# Patient Record
Sex: MALE | Race: WHITE | NOT HISPANIC OR LATINO
[De-identification: names, ages, dates, MRNs, and addresses within clinical notes are randomized per-mention and may not be internally consistent; named-entity substitution may affect disease eponyms.]

---

## 2017-04-10 ENCOUNTER — APPOINTMENT (OUTPATIENT)
Dept: DERMATOLOGY | Facility: CLINIC | Age: 82
End: 2017-04-10

## 2017-04-10 DIAGNOSIS — F95.9 TIC DISORDER, UNSPECIFIED: ICD-10-CM

## 2017-04-10 DIAGNOSIS — Z98.890 OTHER SPECIFIED POSTPROCEDURAL STATES: ICD-10-CM

## 2017-04-10 DIAGNOSIS — Z78.9 OTHER SPECIFIED HEALTH STATUS: ICD-10-CM

## 2017-04-10 DIAGNOSIS — Z86.79 PERSONAL HISTORY OF OTHER DISEASES OF THE CIRCULATORY SYSTEM: ICD-10-CM

## 2017-04-10 DIAGNOSIS — L60.8 OTHER NAIL DISORDERS: ICD-10-CM

## 2017-04-10 DIAGNOSIS — Z87.891 PERSONAL HISTORY OF NICOTINE DEPENDENCE: ICD-10-CM

## 2017-04-10 DIAGNOSIS — L82.0 INFLAMED SEBORRHEIC KERATOSIS: ICD-10-CM

## 2017-04-10 DIAGNOSIS — Z86.39 PERSONAL HISTORY OF OTHER ENDOCRINE, NUTRITIONAL AND METABOLIC DISEASE: ICD-10-CM

## 2017-04-10 RX ORDER — WARFARIN 2.5 MG/1
2.5 TABLET ORAL
Refills: 0 | Status: ACTIVE | COMMUNITY

## 2017-04-10 RX ORDER — LOSARTAN POTASSIUM AND HYDROCHLOROTHIAZIDE 25; 100 MG/1; MG/1
100-25 TABLET ORAL
Refills: 0 | Status: ACTIVE | COMMUNITY

## 2017-04-10 RX ORDER — CITALOPRAM HYDROBROMIDE 20 MG/1
20 TABLET, FILM COATED ORAL
Refills: 0 | Status: ACTIVE | COMMUNITY

## 2017-04-10 RX ORDER — AMLODIPINE BESYLATE 5 MG/1
5 TABLET ORAL
Refills: 0 | Status: ACTIVE | COMMUNITY

## 2017-04-10 RX ORDER — MOMETASONE FUROATE 1 MG/G
0.1 OINTMENT TOPICAL TWICE DAILY
Qty: 1 | Refills: 2 | Status: ACTIVE | COMMUNITY
Start: 2017-04-10 | End: 1900-01-01

## 2017-10-25 ENCOUNTER — APPOINTMENT (OUTPATIENT)
Dept: DERMATOLOGY | Facility: CLINIC | Age: 82
End: 2017-10-25

## 2020-11-17 ENCOUNTER — APPOINTMENT (OUTPATIENT)
Dept: DERMATOLOGY | Facility: CLINIC | Age: 85
End: 2020-11-17
Payer: MEDICARE

## 2020-11-17 VITALS — WEIGHT: 185 LBS | BODY MASS INDEX: 25.06 KG/M2 | HEIGHT: 72 IN

## 2020-11-17 DIAGNOSIS — D18.01 HEMANGIOMA OF SKIN AND SUBCUTANEOUS TISSUE: ICD-10-CM

## 2020-11-17 DIAGNOSIS — D22.9 MELANOCYTIC NEVI, UNSPECIFIED: ICD-10-CM

## 2020-11-17 DIAGNOSIS — Z00.00 ENCOUNTER FOR GENERAL ADULT MEDICAL EXAMINATION W/OUT ABNORMAL FINDINGS: ICD-10-CM

## 2020-11-17 DIAGNOSIS — L82.1 OTHER SEBORRHEIC KERATOSIS: ICD-10-CM

## 2020-11-17 PROCEDURE — 99203 OFFICE O/P NEW LOW 30 MIN: CPT

## 2020-11-17 RX ORDER — SILDENAFIL 100 MG/1
100 TABLET, FILM COATED ORAL
Qty: 18 | Refills: 0 | Status: ACTIVE | COMMUNITY
Start: 2020-02-03

## 2020-11-17 RX ORDER — METFORMIN ER 500 MG 500 MG/1
500 TABLET ORAL
Qty: 360 | Refills: 0 | Status: ACTIVE | COMMUNITY
Start: 2020-06-24

## 2020-11-17 RX ORDER — FINASTERIDE 5 MG/1
5 TABLET, FILM COATED ORAL
Refills: 0 | Status: DISCONTINUED | COMMUNITY
End: 2020-11-17

## 2020-11-17 RX ORDER — DIAZEPAM 10 MG/1
10 TABLET ORAL
Qty: 30 | Refills: 0 | Status: DISCONTINUED | COMMUNITY
Start: 2020-08-28

## 2020-11-17 RX ORDER — METFORMIN HYDROCHLORIDE 500 MG/1
500 TABLET, COATED ORAL
Refills: 0 | Status: DISCONTINUED | COMMUNITY
End: 2020-11-17

## 2020-11-17 RX ORDER — ZOLPIDEM TARTRATE 10 MG/1
10 TABLET ORAL
Qty: 30 | Refills: 0 | Status: ACTIVE | COMMUNITY
Start: 2020-08-28

## 2020-11-17 RX ORDER — AZITHROMYCIN 250 MG/1
250 TABLET, FILM COATED ORAL
Qty: 6 | Refills: 0 | Status: COMPLETED | COMMUNITY
Start: 2020-09-18

## 2020-11-17 RX ORDER — ZOLPIDEM TARTRATE 5 MG/1
5 TABLET, FILM COATED ORAL
Refills: 0 | Status: DISCONTINUED | COMMUNITY
End: 2020-11-17

## 2020-11-17 RX ORDER — METFORMIN HYDROCHLORIDE 1000 MG/1
1000 TABLET, COATED ORAL
Refills: 0 | Status: DISCONTINUED | COMMUNITY
End: 2020-11-17

## 2020-11-17 RX ORDER — MIRTAZAPINE 7.5 MG/1
7.5 TABLET, FILM COATED ORAL
Qty: 90 | Refills: 0 | Status: ACTIVE | COMMUNITY
Start: 2020-09-10

## 2020-11-17 NOTE — HISTORY OF PRESENT ILLNESS
[FreeTextEntry1] : Patient presents for skin examination. [de-identified] : Notes many lesions on the trunk and exts.  No bleeding or tender lesions.  Increasing in number over the many years.

## 2020-11-17 NOTE — PHYSICAL EXAM
[FreeTextEntry3] : A full skin exam was performed including the scalp, face (including lips, ears, nose and eyes), neck, chest, abdomen, back, buttocks, upper extremities and lower extremities.  The patient declined examination of the genitalia.  \par The exam revealed the following benign growths:\par Ashe pigmented nevi.\par Seborrheic keratoses.\par Cherry angioma.\par

## 2021-10-17 ENCOUNTER — EMERGENCY (EMERGENCY)
Facility: HOSPITAL | Age: 86
LOS: 1 days | Discharge: HOME | End: 2021-10-17
Attending: INTERNAL MEDICINE | Admitting: INTERNAL MEDICINE
Payer: MEDICARE

## 2021-10-17 VITALS
SYSTOLIC BLOOD PRESSURE: 161 MMHG | OXYGEN SATURATION: 96 % | DIASTOLIC BLOOD PRESSURE: 88 MMHG | HEART RATE: 70 BPM | WEIGHT: 188.05 LBS | RESPIRATION RATE: 18 BRPM | TEMPERATURE: 98 F | HEIGHT: 75 IN

## 2021-10-17 LAB
ALBUMIN SERPL ELPH-MCNC: 3.6 G/DL — SIGNIFICANT CHANGE UP (ref 3.3–5)
ALP SERPL-CCNC: 107 U/L — SIGNIFICANT CHANGE UP (ref 40–120)
ALT FLD-CCNC: 47 U/L — SIGNIFICANT CHANGE UP (ref 12–78)
ANION GAP SERPL CALC-SCNC: 5 MMOL/L — SIGNIFICANT CHANGE UP (ref 5–17)
AST SERPL-CCNC: 23 U/L — SIGNIFICANT CHANGE UP (ref 15–37)
BILIRUB SERPL-MCNC: 0.7 MG/DL — SIGNIFICANT CHANGE UP (ref 0.2–1.2)
BUN SERPL-MCNC: 30 MG/DL — HIGH (ref 7–23)
CALCIUM SERPL-MCNC: 8.8 MG/DL — SIGNIFICANT CHANGE UP (ref 8.5–10.1)
CHLORIDE SERPL-SCNC: 112 MMOL/L — HIGH (ref 96–108)
CO2 SERPL-SCNC: 26 MMOL/L — SIGNIFICANT CHANGE UP (ref 22–31)
CREAT SERPL-MCNC: 1.6 MG/DL — HIGH (ref 0.5–1.3)
GLUCOSE SERPL-MCNC: 244 MG/DL — HIGH (ref 70–99)
HCT VFR BLD CALC: 35.4 % — LOW (ref 39–50)
HGB BLD-MCNC: 11.6 G/DL — LOW (ref 13–17)
MCHC RBC-ENTMCNC: 31.4 PG — SIGNIFICANT CHANGE UP (ref 27–34)
MCHC RBC-ENTMCNC: 32.8 GM/DL — SIGNIFICANT CHANGE UP (ref 32–36)
MCV RBC AUTO: 95.7 FL — SIGNIFICANT CHANGE UP (ref 80–100)
NRBC # BLD: 0 /100 WBCS — SIGNIFICANT CHANGE UP (ref 0–0)
PLATELET # BLD AUTO: 122 K/UL — LOW (ref 150–400)
POTASSIUM SERPL-MCNC: 4 MMOL/L — SIGNIFICANT CHANGE UP (ref 3.5–5.3)
POTASSIUM SERPL-SCNC: 4 MMOL/L — SIGNIFICANT CHANGE UP (ref 3.5–5.3)
PROT SERPL-MCNC: 7.4 G/DL — SIGNIFICANT CHANGE UP (ref 6–8.3)
RBC # BLD: 3.7 M/UL — LOW (ref 4.2–5.8)
RBC # FLD: 13.5 % — SIGNIFICANT CHANGE UP (ref 10.3–14.5)
SODIUM SERPL-SCNC: 143 MMOL/L — SIGNIFICANT CHANGE UP (ref 135–145)
WBC # BLD: 6.97 K/UL — SIGNIFICANT CHANGE UP (ref 3.8–10.5)
WBC # FLD AUTO: 6.97 K/UL — SIGNIFICANT CHANGE UP (ref 3.8–10.5)

## 2021-10-17 PROCEDURE — 99284 EMERGENCY DEPT VISIT MOD MDM: CPT

## 2021-10-17 PROCEDURE — 93010 ELECTROCARDIOGRAM REPORT: CPT

## 2021-10-17 PROCEDURE — 74176 CT ABD & PELVIS W/O CONTRAST: CPT | Mod: 26,MA

## 2021-10-17 RX ORDER — SODIUM CHLORIDE 9 MG/ML
1000 INJECTION INTRAMUSCULAR; INTRAVENOUS; SUBCUTANEOUS ONCE
Refills: 0 | Status: COMPLETED | OUTPATIENT
Start: 2021-10-17 | End: 2021-10-17

## 2021-10-17 RX ADMIN — SODIUM CHLORIDE 1000 MILLILITER(S): 9 INJECTION INTRAMUSCULAR; INTRAVENOUS; SUBCUTANEOUS at 23:52

## 2021-10-17 RX ADMIN — SODIUM CHLORIDE 1000 MILLILITER(S): 9 INJECTION INTRAMUSCULAR; INTRAVENOUS; SUBCUTANEOUS at 22:52

## 2021-10-17 NOTE — ED PROVIDER NOTE - PATIENT PORTAL LINK FT
You can access the FollowMyHealth Patient Portal offered by [facility] by registering at the following website: [email or web address]. By joining [employer] [facility]’s FollowMyHealth portal, you will also be able to view your health information using other applications (apps) compatible with our system.

## 2021-10-17 NOTE — ED PROVIDER NOTE - GASTROINTESTINAL, MLM
Abdomen soft, non-tender, no guarding. digital with external hemorrhoid no bleeding , impaction loosened , good response after fleet

## 2021-10-17 NOTE — ED PROVIDER NOTE - CARE PROVIDER_API CALL
Zay Olivo ()  Internal Medicine  [address] [place] [address]  Phone: [phone]  Fax: [phone]  Follow Up Time: 1-3 Days

## 2021-10-17 NOTE — ED PROVIDER NOTE - OBJECTIVE STATEMENT
C/o ABD pain and constipation.  abdominal pain 89 y/o male C/O ABD pain and constipation.  no nausea no vomiting no fever no chills, no urinary symptoms no GIB

## 2021-10-17 NOTE — ED PROVIDER NOTE - NSFOLLOWUPINSTRUCTIONS_ED_ALL_ED_FT
Take Miralax as directed f/u with the  GI doctor     Many things can cause abdominal pain. Many times, abdominal pain is not caused by a disease and will improve without treatment. Your health care provider will do a physical exam to determine if there is a dangerous cause of your pain; blood tests and imaging may help determine the cause of your pain. However, in many cases, no cause may be found and you may need further testing as an outpatient. Monitor your abdominal pain for any changes.     SEEK IMMEDIATE MEDICAL CARE IF YOU HAVE ANY OF THE FOLLOWING SYMPTOMS: worsening abdominal pain, uncontrollable vomiting, profuse diarrhea, inability to have bowel movements or pass gas, black or bloody stools, fever accompanying chest pain or back pain, or fainting. These symptoms may represent a serious problem that is an emergency. Do not wait to see if the symptoms will go away. Get medical help right away. Call 911 and do not drive yourself to the hospital.

## 2021-10-17 NOTE — ED ADULT NURSE NOTE - OBJECTIVE STATEMENT
Pt. received alert and oriented x3 with chief complaint of constipation for 2 days. Pt. denies N/V. Pt. presents w/ distended firm abdomen.

## 2021-10-18 VITALS
DIASTOLIC BLOOD PRESSURE: 75 MMHG | SYSTOLIC BLOOD PRESSURE: 158 MMHG | TEMPERATURE: 98 F | RESPIRATION RATE: 16 BRPM | HEART RATE: 90 BPM | OXYGEN SATURATION: 98 %

## 2021-10-18 LAB
APPEARANCE UR: CLEAR — SIGNIFICANT CHANGE UP
BACTERIA # UR AUTO: ABNORMAL
BILIRUB UR-MCNC: NEGATIVE — SIGNIFICANT CHANGE UP
COLOR SPEC: YELLOW — SIGNIFICANT CHANGE UP
DIFF PNL FLD: ABNORMAL
EPI CELLS # UR: NEGATIVE — SIGNIFICANT CHANGE UP
GLUCOSE UR QL: 250
KETONES UR-MCNC: NEGATIVE — SIGNIFICANT CHANGE UP
LEUKOCYTE ESTERASE UR-ACNC: NEGATIVE — SIGNIFICANT CHANGE UP
NITRITE UR-MCNC: NEGATIVE — SIGNIFICANT CHANGE UP
PH UR: 5 — SIGNIFICANT CHANGE UP (ref 5–8)
PROT UR-MCNC: 30 MG/DL
RBC CASTS # UR COMP ASSIST: ABNORMAL /HPF (ref 0–4)
SP GR SPEC: 1.01 — SIGNIFICANT CHANGE UP (ref 1.01–1.02)
UROBILINOGEN FLD QL: NEGATIVE — SIGNIFICANT CHANGE UP
WBC UR QL: SIGNIFICANT CHANGE UP

## 2021-10-18 PROCEDURE — 93005 ELECTROCARDIOGRAM TRACING: CPT

## 2021-10-18 PROCEDURE — 99284 EMERGENCY DEPT VISIT MOD MDM: CPT | Mod: 25

## 2021-10-18 PROCEDURE — 74176 CT ABD & PELVIS W/O CONTRAST: CPT | Mod: MA

## 2021-10-18 PROCEDURE — 96360 HYDRATION IV INFUSION INIT: CPT

## 2021-10-18 PROCEDURE — 80053 COMPREHEN METABOLIC PANEL: CPT

## 2021-10-18 PROCEDURE — 85027 COMPLETE CBC AUTOMATED: CPT

## 2021-10-18 PROCEDURE — 81001 URINALYSIS AUTO W/SCOPE: CPT

## 2021-10-18 PROCEDURE — 36415 COLL VENOUS BLD VENIPUNCTURE: CPT

## 2021-10-18 RX ADMIN — Medication 1 ENEMA: at 02:45

## 2022-01-16 ENCOUNTER — EMERGENCY (EMERGENCY)
Facility: HOSPITAL | Age: 87
LOS: 1 days | Discharge: HOME | End: 2022-01-16
Attending: STUDENT IN AN ORGANIZED HEALTH CARE EDUCATION/TRAINING PROGRAM | Admitting: STUDENT IN AN ORGANIZED HEALTH CARE EDUCATION/TRAINING PROGRAM
Payer: MEDICARE

## 2022-01-16 VITALS
SYSTOLIC BLOOD PRESSURE: 160 MMHG | WEIGHT: 184.97 LBS | HEIGHT: 75 IN | DIASTOLIC BLOOD PRESSURE: 80 MMHG | HEART RATE: 86 BPM | OXYGEN SATURATION: 96 % | RESPIRATION RATE: 18 BRPM | TEMPERATURE: 98 F

## 2022-01-16 LAB
ALBUMIN SERPL ELPH-MCNC: 3.6 G/DL — SIGNIFICANT CHANGE UP (ref 3.3–5)
ALP SERPL-CCNC: 76 U/L — SIGNIFICANT CHANGE UP (ref 40–120)
ALT FLD-CCNC: 51 U/L — SIGNIFICANT CHANGE UP (ref 12–78)
APTT BLD: 34.3 SEC — SIGNIFICANT CHANGE UP (ref 27.5–35.5)
AST SERPL-CCNC: 28 U/L — SIGNIFICANT CHANGE UP (ref 15–37)
BASOPHILS # BLD AUTO: 0.04 K/UL — SIGNIFICANT CHANGE UP (ref 0–0.2)
BASOPHILS NFR BLD AUTO: 0.7 % — SIGNIFICANT CHANGE UP (ref 0–2)
BILIRUB SERPL-MCNC: 0.6 MG/DL — SIGNIFICANT CHANGE UP (ref 0.2–1.2)
BUN SERPL-MCNC: 29 MG/DL — HIGH (ref 7–23)
CALCIUM SERPL-MCNC: 8.3 MG/DL — LOW (ref 8.5–10.1)
CHLORIDE SERPL-SCNC: 109 MMOL/L — SIGNIFICANT CHANGE UP (ref 96–108)
CO2 SERPL-SCNC: 25 MMOL/L — SIGNIFICANT CHANGE UP (ref 22–31)
CREAT SERPL-MCNC: 1.4 MG/DL — HIGH (ref 0.5–1.3)
EOSINOPHIL # BLD AUTO: 0.07 K/UL — SIGNIFICANT CHANGE UP (ref 0–0.5)
EOSINOPHIL NFR BLD AUTO: 1.2 % — SIGNIFICANT CHANGE UP (ref 0–6)
GLUCOSE SERPL-MCNC: 204 MG/DL — HIGH (ref 70–99)
HCT VFR BLD CALC: 36 % — LOW (ref 39–50)
HGB BLD-MCNC: 12.5 G/DL — LOW (ref 13–17)
IMM GRANULOCYTES NFR BLD AUTO: 0.5 % — SIGNIFICANT CHANGE UP (ref 0–1.5)
INR BLD: 2.3 RATIO — HIGH (ref 0.88–1.16)
LYMPHOCYTES # BLD AUTO: 0.96 K/UL — LOW (ref 1–3.3)
LYMPHOCYTES # BLD AUTO: 15.8 % — SIGNIFICANT CHANGE UP (ref 13–44)
MCHC RBC-ENTMCNC: 32.9 PG — SIGNIFICANT CHANGE UP (ref 27–34)
MCHC RBC-ENTMCNC: 34.7 GM/DL — SIGNIFICANT CHANGE UP (ref 32–36)
MCV RBC AUTO: 94.7 FL — SIGNIFICANT CHANGE UP (ref 80–100)
MONOCYTES # BLD AUTO: 0.45 K/UL — SIGNIFICANT CHANGE UP (ref 0–0.9)
MONOCYTES NFR BLD AUTO: 7.4 % — SIGNIFICANT CHANGE UP (ref 2–14)
NEUTROPHILS # BLD AUTO: 4.53 K/UL — SIGNIFICANT CHANGE UP (ref 1.8–7.4)
NEUTROPHILS NFR BLD AUTO: 74.4 % — SIGNIFICANT CHANGE UP (ref 43–77)
NRBC # BLD: 0 /100 WBCS — SIGNIFICANT CHANGE UP (ref 0–0)
PLATELET # BLD AUTO: 106 K/UL — LOW (ref 150–400)
POTASSIUM SERPL-MCNC: 3.9 MMOL/L — SIGNIFICANT CHANGE UP (ref 3.5–5.3)
POTASSIUM SERPL-SCNC: 3.9 MMOL/L — SIGNIFICANT CHANGE UP (ref 3.5–5.3)
PROT SERPL-MCNC: 7.1 G/DL — SIGNIFICANT CHANGE UP (ref 6–8.3)
PROTHROM AB SERPL-ACNC: 25.9 SEC — HIGH (ref 10.6–13.6)
RBC # BLD: 3.8 M/UL — LOW (ref 4.2–5.8)
RBC # FLD: 13.7 % — SIGNIFICANT CHANGE UP (ref 10.3–14.5)
SODIUM SERPL-SCNC: 141 MMOL/L — SIGNIFICANT CHANGE UP (ref 135–145)
WBC # BLD: 6.08 K/UL — SIGNIFICANT CHANGE UP (ref 3.8–10.5)
WBC # FLD AUTO: 6.08 K/UL — SIGNIFICANT CHANGE UP (ref 3.8–10.5)

## 2022-01-16 PROCEDURE — 36415 COLL VENOUS BLD VENIPUNCTURE: CPT

## 2022-01-16 PROCEDURE — 70486 CT MAXILLOFACIAL W/O DYE: CPT | Mod: MA

## 2022-01-16 PROCEDURE — 71045 X-RAY EXAM CHEST 1 VIEW: CPT | Mod: 26

## 2022-01-16 PROCEDURE — 85610 PROTHROMBIN TIME: CPT

## 2022-01-16 PROCEDURE — 72110 X-RAY EXAM L-2 SPINE 4/>VWS: CPT

## 2022-01-16 PROCEDURE — 90715 TDAP VACCINE 7 YRS/> IM: CPT

## 2022-01-16 PROCEDURE — 70486 CT MAXILLOFACIAL W/O DYE: CPT | Mod: 26,MA

## 2022-01-16 PROCEDURE — 96374 THER/PROPH/DIAG INJ IV PUSH: CPT

## 2022-01-16 PROCEDURE — 73030 X-RAY EXAM OF SHOULDER: CPT | Mod: 26,LT

## 2022-01-16 PROCEDURE — 85025 COMPLETE CBC W/AUTO DIFF WBC: CPT

## 2022-01-16 PROCEDURE — 80053 COMPREHEN METABOLIC PANEL: CPT

## 2022-01-16 PROCEDURE — 70450 CT HEAD/BRAIN W/O DYE: CPT | Mod: MA

## 2022-01-16 PROCEDURE — 72110 X-RAY EXAM L-2 SPINE 4/>VWS: CPT | Mod: 26

## 2022-01-16 PROCEDURE — 85730 THROMBOPLASTIN TIME PARTIAL: CPT

## 2022-01-16 PROCEDURE — 99284 EMERGENCY DEPT VISIT MOD MDM: CPT

## 2022-01-16 PROCEDURE — 99284 EMERGENCY DEPT VISIT MOD MDM: CPT | Mod: 25

## 2022-01-16 PROCEDURE — 71045 X-RAY EXAM CHEST 1 VIEW: CPT

## 2022-01-16 PROCEDURE — 73030 X-RAY EXAM OF SHOULDER: CPT

## 2022-01-16 PROCEDURE — 72125 CT NECK SPINE W/O DYE: CPT | Mod: 26,MA

## 2022-01-16 PROCEDURE — 90471 IMMUNIZATION ADMIN: CPT

## 2022-01-16 PROCEDURE — 70450 CT HEAD/BRAIN W/O DYE: CPT | Mod: 26,MA

## 2022-01-16 PROCEDURE — 72125 CT NECK SPINE W/O DYE: CPT | Mod: MA

## 2022-01-16 RX ORDER — ACETAMINOPHEN 500 MG
1000 TABLET ORAL ONCE
Refills: 0 | Status: COMPLETED | OUTPATIENT
Start: 2022-01-16 | End: 2022-01-16

## 2022-01-16 RX ORDER — LIDOCAINE 4 G/100G
1 CREAM TOPICAL ONCE
Refills: 0 | Status: COMPLETED | OUTPATIENT
Start: 2022-01-16 | End: 2022-01-16

## 2022-01-16 RX ORDER — TETANUS TOXOID, REDUCED DIPHTHERIA TOXOID AND ACELLULAR PERTUSSIS VACCINE, ADSORBED 5; 2.5; 8; 8; 2.5 [IU]/.5ML; [IU]/.5ML; UG/.5ML; UG/.5ML; UG/.5ML
0.5 SUSPENSION INTRAMUSCULAR ONCE
Refills: 0 | Status: COMPLETED | OUTPATIENT
Start: 2022-01-16 | End: 2022-01-16

## 2022-01-16 RX ORDER — OXYMETAZOLINE HYDROCHLORIDE 0.5 MG/ML
2 SPRAY NASAL ONCE
Refills: 0 | Status: COMPLETED | OUTPATIENT
Start: 2022-01-16 | End: 2022-01-16

## 2022-01-16 RX ADMIN — OXYMETAZOLINE HYDROCHLORIDE 2 SPRAY(S): 0.5 SPRAY NASAL at 09:36

## 2022-01-16 RX ADMIN — Medication 400 MILLIGRAM(S): at 09:35

## 2022-01-16 RX ADMIN — LIDOCAINE 1 PATCH: 4 CREAM TOPICAL at 09:36

## 2022-01-16 RX ADMIN — TETANUS TOXOID, REDUCED DIPHTHERIA TOXOID AND ACELLULAR PERTUSSIS VACCINE, ADSORBED 0.5 MILLILITER(S): 5; 2.5; 8; 8; 2.5 SUSPENSION INTRAMUSCULAR at 09:36

## 2022-01-16 NOTE — ED PROVIDER NOTE - PROGRESS NOTE DETAILS
ct results noted- nasal septum visulized, no septal hematoma noted. will discharge with ENT follow up.

## 2022-01-16 NOTE — ED PROVIDER NOTE - CARE PLAN
Principal Discharge DX:	Nasal fracture  Secondary Diagnosis:	Closed head injury  Secondary Diagnosis:	Back pain  Secondary Diagnosis:	Fall   1

## 2022-01-16 NOTE — ED PROVIDER NOTE - CLINICAL SUMMARY MEDICAL DECISION MAKING FREE TEXT BOX
trauma work up with ct brain, cspine, maxillofacial, XR chest, left shoulder, L spine. check inr, treat pain.

## 2022-01-16 NOTE — ED PROVIDER NOTE - NS ED ROS FT
Constitutional: No fever.  Eyes: No vision changes.   Ears, Nose, Mouth, Throat: No congestion.  Cardiovascular: No chest pain.  Respiratory: No difficulty breathing.  Gastrointestinal: No nausea. No vomiting.  Genitourinary: No dysuria.  Musculoskeletal: + back / shoulder pain.  Neurological: No headache.  Integumentary (skin and/or breast): No rash.

## 2022-01-16 NOTE — ED PROVIDER NOTE - NSFOLLOWUPINSTRUCTIONS_ED_ALL_ED_FT
Please follow up with your Primary Care Physician and call ENT to get a follow up appointment as soon as possible.  You can take acetaminophen (Tylenol) for your pain. It is available over the counter. You can take up to 1 gram (three 325mg tablets or two 500mg tablets) every 4-6 hours as needed for you.    Please take your other medications as prescribed.  If your symptoms persist or worsen, please seek care. Either return to the Emergency Department, go to urgent care or see your primary care doctor.  See refer to general information and follow up instructions below:     Nasal Fracture    WHAT YOU NEED TO KNOW:    A nasal fracture is a crack or break in your nose. You may have a break in the upper nose (bridge), the side, or in the septum. The septum is in the middle of the nose and divides your nostrils. Nasal fractures are caused by a hard hit to the nose. They may be caused by a motor vehicle crash, sports injury, fall, or a fight.         DISCHARGE INSTRUCTIONS:    Return to the emergency department if:     You feel like one or both of your nasal passages are blocked and you have trouble breathing.       Clear fluid is leaking from your nose.      Your have severe nose pain, even after you take medicine.       You have double vision or have problems moving your eyes.     Contact your healthcare provider if:     You have a fever.       You continue to have nosebleeds.      You have a headache that gets worse, even after you take pain medicine.      Your splint or packing are loose.      You have questions about your condition or care.    Medicines:     Medicine may be given to decrease pain or help prevent a bacterial infection. Ask how to take pain medicine safely. Medicine may also be given to decrease nasal swelling and help make breathing easier.       Take your medicine as directed. Contact your healthcare provider if you think your medicine is not helping or if you have side effects. Tell him or her if you are allergic to any medicine. Keep a list of the medicines, vitamins, and herbs you take. Include the amounts, and when and why you take them. Bring the list or the pill bottles to follow-up visits. Carry your medicine list with you in case of an emergency.    Wound care: Ask your healthcare provider how to care for your wounds, splint, or packing.    How to care for your nasal fracture:     Apply ice on your nose for 15 to 20 minutes every hour or as directed. Use an ice pack, or put crushed ice in a plastic bag. Cover it with a towel. Ice helps prevent tissue damage and decreases swelling and pain.      Elevate your head when you lie down. This will help decrease swelling and pain. You may need to see a specialist 3 to 5 days later for tests or more treatment after swelling has decreased.       Protect your nose to prevent bleeding, bruising, or another fracture. Try not to bump your nose on anything. You may not be able to participate in sports for up to 6 weeks.     Follow up with a specialist or your healthcare provider in 2 to 5 days as directed: Write down any questions you have so you remember to ask them during your visits. Sometimes follow-up care is needed months or even years later to correct problems.

## 2022-01-16 NOTE — ED PROVIDER NOTE - CARE PROVIDER_API CALL
Adriano Bowling (DO)  Surgery  [address] [place] [address]  Phone: [phone]  Fax: [phone]  Established Patient  Follow Up Time: 1-3 Days

## 2022-01-16 NOTE — ED PROVIDER NOTE - PHYSICAL EXAMINATION
Vital signs as available reviewed.  General:  Comfortable, no acute distress.  Head:  Normocephalic  Face: nose swollen, with repaired laceration to right side of nose and packing in left nare, no active bleeding. laceration reair done lateral to right eye. periorbital echhymosis noted.  Eyes:  Conjunctiva pink, no icterus. Pupils equal, round, reactive to light, extra-occular eye movements intact.  Cardiovascular:  Regular rate, no obvious murmur.  Respiratory:  Clear to auscultation, good air entry bilaterally.  Abdomen:  Soft, non-tender.  Musculoskeletal:  No tenderness to palpation over c/t/l spine. No tenderness to palpation over chest wall, clavicles, upper/lower arms, hip/pelvis, upper / lower legs. mild tenderness to palpation over left shoulder.  Neurologic: Alert and oriented, moving all extremities. Sensation intact.  Skin:  Warm and dry.

## 2022-01-16 NOTE — ED ADULT NURSE NOTE - BEFAST SPEECH
Elevate it as practical  Intermittent icing  Wear the boot  Tylenol, if needed, for pain  Follow up with orthopedics  
Yes

## 2022-01-16 NOTE — ED ADULT NURSE NOTE - OBJECTIVE STATEMENT
A/ox4 patient states he was getting out of bed this AM and had a mechanical fall. Patient went to urgent care with facial laceration, bruising, epistaxis, lower back pain and shoulder pain. Patient right facial laceration sutured at urgent care. Patient on coumadin at this time. No altered mental status, pending further labs and radiology reports. Will cont. to monitor.

## 2022-01-16 NOTE — ED PROVIDER NOTE - PATIENT PORTAL LINK FT
You can access the FollowMyHealth Patient Portal offered by [facility] by registering at the following website: [email or web address]. By joining [employer]’s FollowMyHealth portal, you will also be able to view your health information using other applications (apps) compatible with our system.

## 2022-01-16 NOTE — ED PROVIDER NOTE - OBJECTIVE STATEMENT
89 F on warfarin here s/p fall. patient tripped and fell hitting face. patient went to us, had lacerations repaired on face then was sent here for further evaluation. patient complaining of low back pain and left shoulder pain. no headache, loss of consciousness, nausea, vomiting. no pain meds taken prior to arrival. unknown last inr. no tetanus give at us, last dose unknown.

## 2022-01-27 ENCOUNTER — INPATIENT (INPATIENT)
Facility: HOSPITAL | Age: 87
LOS: 5 days | Discharge: HOME | DRG: 155 | End: 2022-02-02
Attending: INTERNAL MEDICINE | Admitting: INTERNAL MEDICINE
Payer: MEDICARE

## 2022-01-27 VITALS
RESPIRATION RATE: 16 BRPM | WEIGHT: 184.97 LBS | DIASTOLIC BLOOD PRESSURE: 75 MMHG | SYSTOLIC BLOOD PRESSURE: 136 MMHG | HEIGHT: 75 IN | TEMPERATURE: 98 F | OXYGEN SATURATION: 98 % | HEART RATE: 78 BPM

## 2022-01-27 DIAGNOSIS — Z29.9 ENCOUNTER FOR PROPHYLACTIC MEASURES, UNSPECIFIED: ICD-10-CM

## 2022-01-27 DIAGNOSIS — I10 ESSENTIAL (PRIMARY) HYPERTENSION: ICD-10-CM

## 2022-01-27 DIAGNOSIS — J34.0 ABSCESS, FURUNCLE AND CARBUNCLE OF NOSE: ICD-10-CM

## 2022-01-27 DIAGNOSIS — Z95.2 PRESENCE OF PROSTHETIC HEART VALVE: ICD-10-CM

## 2022-01-27 DIAGNOSIS — G47.00 INSOMNIA, UNSPECIFIED: ICD-10-CM

## 2022-01-27 DIAGNOSIS — R79.1 ABNORMAL COAGULATION PROFILE: ICD-10-CM

## 2022-01-27 DIAGNOSIS — E11.9 TYPE 2 DIABETES MELLITUS WITHOUT COMPLICATIONS: ICD-10-CM

## 2022-01-27 DIAGNOSIS — F41.9 ANXIETY DISORDER, UNSPECIFIED: ICD-10-CM

## 2022-01-27 DIAGNOSIS — E87.1 HYPO-OSMOLALITY AND HYPONATREMIA: ICD-10-CM

## 2022-01-27 DIAGNOSIS — F32.9 MAJOR DEPRESSIVE DISORDER, SINGLE EPISODE, UNSPECIFIED: ICD-10-CM

## 2022-01-27 DIAGNOSIS — N17.9 ACUTE KIDNEY FAILURE, UNSPECIFIED: ICD-10-CM

## 2022-01-27 LAB
ALBUMIN SERPL ELPH-MCNC: 2.9 G/DL — LOW (ref 3.3–5)
ALP SERPL-CCNC: 84 U/L — SIGNIFICANT CHANGE UP (ref 40–120)
ALT FLD-CCNC: 16 U/L — SIGNIFICANT CHANGE UP (ref 12–78)
ANION GAP SERPL CALC-SCNC: 7 MMOL/L — SIGNIFICANT CHANGE UP (ref 5–17)
AST SERPL-CCNC: 8 U/L — LOW (ref 15–37)
BASOPHILS # BLD AUTO: 0 K/UL — SIGNIFICANT CHANGE UP (ref 0–0.2)
BASOPHILS NFR BLD AUTO: 0 % — SIGNIFICANT CHANGE UP (ref 0–2)
BILIRUB SERPL-MCNC: 0.7 MG/DL — SIGNIFICANT CHANGE UP (ref 0.2–1.2)
BUN SERPL-MCNC: 32 MG/DL — HIGH (ref 7–23)
CALCIUM SERPL-MCNC: 8.6 MG/DL — SIGNIFICANT CHANGE UP (ref 8.5–10.1)
CHLORIDE SERPL-SCNC: 97 MMOL/L — SIGNIFICANT CHANGE UP (ref 96–108)
CO2 SERPL-SCNC: 27 MMOL/L — SIGNIFICANT CHANGE UP (ref 22–31)
CREAT SERPL-MCNC: 1.5 MG/DL — HIGH (ref 0.5–1.3)
EOSINOPHIL # BLD AUTO: 0 K/UL — SIGNIFICANT CHANGE UP (ref 0–0.5)
EOSINOPHIL NFR BLD AUTO: 0 % — SIGNIFICANT CHANGE UP (ref 0–6)
GLUCOSE SERPL-MCNC: 313 MG/DL — HIGH (ref 70–99)
HCT VFR BLD CALC: 32.2 % — LOW (ref 39–50)
HGB BLD-MCNC: 11.1 G/DL — LOW (ref 13–17)
INR BLD: 5.53 RATIO — CRITICAL HIGH (ref 0.88–1.16)
LACTATE SERPL-SCNC: 1.6 MMOL/L — SIGNIFICANT CHANGE UP (ref 0.7–2)
LYMPHOCYTES # BLD AUTO: 0.49 K/UL — LOW (ref 1–3.3)
LYMPHOCYTES # BLD AUTO: 3 % — LOW (ref 13–44)
MCHC RBC-ENTMCNC: 31.9 PG — SIGNIFICANT CHANGE UP (ref 27–34)
MCHC RBC-ENTMCNC: 34.5 GM/DL — SIGNIFICANT CHANGE UP (ref 32–36)
MCV RBC AUTO: 92.5 FL — SIGNIFICANT CHANGE UP (ref 80–100)
MONOCYTES # BLD AUTO: 0.81 K/UL — SIGNIFICANT CHANGE UP (ref 0–0.9)
MONOCYTES NFR BLD AUTO: 5 % — SIGNIFICANT CHANGE UP (ref 2–14)
NEUTROPHILS # BLD AUTO: 14.99 K/UL — HIGH (ref 1.8–7.4)
NEUTROPHILS NFR BLD AUTO: 85 % — HIGH (ref 43–77)
NRBC # BLD: SIGNIFICANT CHANGE UP /100 WBCS (ref 0–0)
PLATELET # BLD AUTO: 209 K/UL — SIGNIFICANT CHANGE UP (ref 150–400)
POTASSIUM SERPL-MCNC: 3.7 MMOL/L — SIGNIFICANT CHANGE UP (ref 3.5–5.3)
POTASSIUM SERPL-SCNC: 3.7 MMOL/L — SIGNIFICANT CHANGE UP (ref 3.5–5.3)
PROT SERPL-MCNC: 7.3 G/DL — SIGNIFICANT CHANGE UP (ref 6–8.3)
PROTHROM AB SERPL-ACNC: 59.6 SEC — HIGH (ref 10.6–13.6)
RBC # BLD: 3.48 M/UL — LOW (ref 4.2–5.8)
RBC # FLD: 13.3 % — SIGNIFICANT CHANGE UP (ref 10.3–14.5)
SARS-COV-2 RNA SPEC QL NAA+PROBE: SIGNIFICANT CHANGE UP
SODIUM SERPL-SCNC: 131 MMOL/L — LOW (ref 135–145)
WBC # BLD: 16.29 K/UL — HIGH (ref 3.8–10.5)
WBC # FLD AUTO: 16.29 K/UL — HIGH (ref 3.8–10.5)

## 2022-01-27 PROCEDURE — 70487 CT MAXILLOFACIAL W/DYE: CPT | Mod: 26

## 2022-01-27 PROCEDURE — 99223 1ST HOSP IP/OBS HIGH 75: CPT | Mod: GC

## 2022-01-27 PROCEDURE — 71045 X-RAY EXAM CHEST 1 VIEW: CPT | Mod: 26

## 2022-01-27 PROCEDURE — 93010 ELECTROCARDIOGRAM REPORT: CPT

## 2022-01-27 PROCEDURE — 70450 CT HEAD/BRAIN W/O DYE: CPT | Mod: 26

## 2022-01-27 PROCEDURE — 99284 EMERGENCY DEPT VISIT MOD MDM: CPT

## 2022-01-27 RX ORDER — CITALOPRAM 10 MG/1
0.5 TABLET, FILM COATED ORAL
Qty: 0 | Refills: 0 | DISCHARGE

## 2022-01-27 RX ORDER — GLUCAGON INJECTION, SOLUTION 0.5 MG/.1ML
1 INJECTION, SOLUTION SUBCUTANEOUS ONCE
Refills: 0 | Status: DISCONTINUED | OUTPATIENT
Start: 2022-01-27 | End: 2022-02-02

## 2022-01-27 RX ORDER — SODIUM CHLORIDE 9 MG/ML
1000 INJECTION, SOLUTION INTRAVENOUS
Refills: 0 | Status: DISCONTINUED | OUTPATIENT
Start: 2022-01-27 | End: 2022-02-02

## 2022-01-27 RX ORDER — METFORMIN HYDROCHLORIDE 850 MG/1
1 TABLET ORAL
Qty: 0 | Refills: 0 | DISCHARGE

## 2022-01-27 RX ORDER — LANOLIN ALCOHOL/MO/W.PET/CERES
5 CREAM (GRAM) TOPICAL AT BEDTIME
Refills: 0 | Status: DISCONTINUED | OUTPATIENT
Start: 2022-01-27 | End: 2022-02-02

## 2022-01-27 RX ORDER — DEXTROSE 50 % IN WATER 50 %
12.5 SYRINGE (ML) INTRAVENOUS ONCE
Refills: 0 | Status: DISCONTINUED | OUTPATIENT
Start: 2022-01-27 | End: 2022-02-02

## 2022-01-27 RX ORDER — WARFARIN SODIUM 2.5 MG/1
1 TABLET ORAL
Qty: 0 | Refills: 0 | DISCHARGE

## 2022-01-27 RX ORDER — SODIUM CHLORIDE 9 MG/ML
1000 INJECTION INTRAMUSCULAR; INTRAVENOUS; SUBCUTANEOUS ONCE
Refills: 0 | Status: COMPLETED | OUTPATIENT
Start: 2022-01-27 | End: 2022-01-27

## 2022-01-27 RX ORDER — METFORMIN HYDROCHLORIDE 850 MG/1
4 TABLET ORAL
Qty: 0 | Refills: 0 | DISCHARGE

## 2022-01-27 RX ORDER — INSULIN LISPRO 100/ML
VIAL (ML) SUBCUTANEOUS
Refills: 0 | Status: DISCONTINUED | OUTPATIENT
Start: 2022-01-27 | End: 2022-01-28

## 2022-01-27 RX ORDER — DEXTROSE 50 % IN WATER 50 %
15 SYRINGE (ML) INTRAVENOUS ONCE
Refills: 0 | Status: DISCONTINUED | OUTPATIENT
Start: 2022-01-27 | End: 2022-02-02

## 2022-01-27 RX ORDER — CITALOPRAM 10 MG/1
20 TABLET, FILM COATED ORAL DAILY
Refills: 0 | Status: DISCONTINUED | OUTPATIENT
Start: 2022-01-27 | End: 2022-02-02

## 2022-01-27 RX ORDER — AMLODIPINE BESYLATE 2.5 MG/1
5 TABLET ORAL DAILY
Refills: 0 | Status: DISCONTINUED | OUTPATIENT
Start: 2022-01-27 | End: 2022-02-02

## 2022-01-27 RX ORDER — VANCOMYCIN HCL 1 G
750 VIAL (EA) INTRAVENOUS EVERY 24 HOURS
Refills: 0 | Status: DISCONTINUED | OUTPATIENT
Start: 2022-01-28 | End: 2022-01-30

## 2022-01-27 RX ORDER — VANCOMYCIN HCL 1 G
1000 VIAL (EA) INTRAVENOUS ONCE
Refills: 0 | Status: COMPLETED | OUTPATIENT
Start: 2022-01-27 | End: 2022-01-27

## 2022-01-27 RX ORDER — SODIUM CHLORIDE 9 MG/ML
1000 INJECTION INTRAMUSCULAR; INTRAVENOUS; SUBCUTANEOUS
Refills: 0 | Status: DISCONTINUED | OUTPATIENT
Start: 2022-01-28 | End: 2022-01-28

## 2022-01-27 RX ORDER — ISOSORBIDE MONONITRATE 60 MG/1
30 TABLET, EXTENDED RELEASE ORAL DAILY
Refills: 0 | Status: DISCONTINUED | OUTPATIENT
Start: 2022-01-27 | End: 2022-02-02

## 2022-01-27 RX ORDER — DEXTROSE 50 % IN WATER 50 %
25 SYRINGE (ML) INTRAVENOUS ONCE
Refills: 0 | Status: DISCONTINUED | OUTPATIENT
Start: 2022-01-27 | End: 2022-02-02

## 2022-01-27 RX ORDER — INSULIN LISPRO 100/ML
VIAL (ML) SUBCUTANEOUS EVERY 6 HOURS
Refills: 0 | Status: DISCONTINUED | OUTPATIENT
Start: 2022-01-27 | End: 2022-01-27

## 2022-01-27 RX ORDER — ACETAMINOPHEN 500 MG
650 TABLET ORAL EVERY 6 HOURS
Refills: 0 | Status: DISCONTINUED | OUTPATIENT
Start: 2022-01-27 | End: 2022-02-02

## 2022-01-27 RX ORDER — CEFEPIME 1 G/1
1000 INJECTION, POWDER, FOR SOLUTION INTRAMUSCULAR; INTRAVENOUS EVERY 12 HOURS
Refills: 0 | Status: DISCONTINUED | OUTPATIENT
Start: 2022-01-28 | End: 2022-01-30

## 2022-01-27 RX ORDER — INSULIN LISPRO 100/ML
VIAL (ML) SUBCUTANEOUS AT BEDTIME
Refills: 0 | Status: DISCONTINUED | OUTPATIENT
Start: 2022-01-27 | End: 2022-01-28

## 2022-01-27 RX ORDER — WARFARIN SODIUM 2.5 MG/1
2 TABLET ORAL
Qty: 0 | Refills: 0 | DISCHARGE

## 2022-01-27 RX ORDER — CEFEPIME 1 G/1
2000 INJECTION, POWDER, FOR SOLUTION INTRAMUSCULAR; INTRAVENOUS ONCE
Refills: 0 | Status: COMPLETED | OUTPATIENT
Start: 2022-01-27 | End: 2022-01-27

## 2022-01-27 RX ADMIN — SODIUM CHLORIDE 1000 MILLILITER(S): 9 INJECTION INTRAMUSCULAR; INTRAVENOUS; SUBCUTANEOUS at 15:57

## 2022-01-27 RX ADMIN — CEFEPIME 100 MILLIGRAM(S): 1 INJECTION, POWDER, FOR SOLUTION INTRAMUSCULAR; INTRAVENOUS at 17:43

## 2022-01-27 RX ADMIN — SODIUM CHLORIDE 1000 MILLILITER(S): 9 INJECTION INTRAMUSCULAR; INTRAVENOUS; SUBCUTANEOUS at 16:57

## 2022-01-27 RX ADMIN — Medication 2: at 22:10

## 2022-01-27 RX ADMIN — Medication 650 MILLIGRAM(S): at 23:20

## 2022-01-27 RX ADMIN — Medication 650 MILLIGRAM(S): at 21:17

## 2022-01-27 RX ADMIN — Medication 1000 MILLIGRAM(S): at 16:57

## 2022-01-27 RX ADMIN — Medication 250 MILLIGRAM(S): at 15:56

## 2022-01-27 NOTE — H&P ADULT - PROBLEM SELECTOR PLAN 4
Hold oral medication of metformin   Start low dose coverage scale   fingerstick glucose checks q6h  HbA1c in AM BUN/Cr of 32/1.5  - Unknown baseline   - S/p 1L NS  - Will give gentle IVF while NPO   - hold home HCTZ and losartan in setting of ROBBIE  - Hold nephrotoxic agents  - Monitor renal indices

## 2022-01-27 NOTE — H&P ADULT - PROBLEM SELECTOR PLAN 5
Hx of aortic and mitral valve replacement   On Warfarin 2.5mg, 2 tabs MWF and 1 tab rest of the days  - INR goal 2.5-3.5  - F/u STAT INR  - Dose Warfarin daily with INR Hx of aortic and mitral valve replacement   On Warfarin 2.5mg, 2 tabs MWF and 1 tab rest of the days  - F/u STAT INR  - d/c warfarin for now in setting of potential ENT procedure -> restart after procedure with INR goal 2.5-3.5  - f/up INR in AM Hold oral medication of metformin   Start low dose coverage scale   fingerstick glucose checks qac and qhs when diet present   fingerstick glucose checks q6h while NPO  HbA1c in AM

## 2022-01-27 NOTE — PRE-OP CHECKLIST - AICD PRESENT
What Is The Reason For Today's Visit?: Full Body Skin Examination What Is The Reason For Today's Visit? (Being Monitored For X): concerning skin lesions on an annual basis How Severe Are Your Spot(S)?: mild no

## 2022-01-27 NOTE — H&P ADULT - NSHPPHYSICALEXAM_GEN_ALL_CORE
T(C): 36.4 (01-27-22 @ 16:17), Max: 36.4 (01-27-22 @ 14:43)  HR: 78 (01-27-22 @ 16:17) (78 - 78)  BP: 136/75 (01-27-22 @ 16:17) (136/75 - 136/75)  RR: 16 (01-27-22 @ 16:17) (16 - 16)  SpO2: 98% (01-27-22 @ 16:17) (98% - 98%)  PHYSICAL EXAM:  GENERAL: NAD, lying in bed comfortably  HEAD/ENT: Traumatic, significant swelling and erythema of nose w/ significant purulence and some dried blood, nose tender to palpation diffusely  EYES: significant swelling L > R w/ some purulence around eye, CN II-VI grossly intact  CHEST/LUNG: Clear to auscultation bilaterally; No rales, rhonchi, wheezing, or rubs. Unlabored respirations  HEART: Regular rate and rhythm; No murmurs, rubs, or gallops  ABDOMEN: Bowel sounds present; Soft, Nontender, Nondistended. No hepatomegally  EXTREMITIES:  2+ Peripheral Pulses, brisk capillary refill. No clubbing, cyanosis, or edema  NERVOUS SYSTEM:  Alert & Oriented X3, speech clear. No deficits   MSK: FROM all 4 extremities, full and equal strength T(C): 36.4 (01-27-22 @ 16:17), Max: 36.4 (01-27-22 @ 14:43)  HR: 78 (01-27-22 @ 16:17) (78 - 78)  BP: 136/75 (01-27-22 @ 16:17) (136/75 - 136/75)  RR: 16 (01-27-22 @ 16:17) (16 - 16)  SpO2: 98% (01-27-22 @ 16:17) (98% - 98%)  PHYSICAL EXAM:  GENERAL: NAD, lying in bed comfortably  HEAD/ENT: Traumatic, significant swelling and erythema of nose w/ significant purulence and some dried blood, nose tender to palpation diffusely with ecchymosis  EYES: significant swelling L > R w/ some purulence around eye, CN II-VI grossly intact  CHEST/LUNG: Clear to auscultation bilaterally; No rales, rhonchi, wheezing, or rubs. Unlabored respirations  HEART: Regular rate and rhythm; No murmurs, rubs, or gallops  ABDOMEN: Bowel sounds present; Soft, Nontender, Nondistended. No hepatomegally  EXTREMITIES:  2+ Peripheral Pulses, brisk capillary refill. No clubbing, cyanosis, or edema  NERVOUS SYSTEM:  Alert & Oriented X3, speech clear. No focal deficits   MSK: FROM all 4 extremities, full and equal strength T(C): 36.4 (01-27-22 @ 16:17), Max: 36.4 (01-27-22 @ 14:43)  HR: 78 (01-27-22 @ 16:17) (78 - 78)  BP: 136/75 (01-27-22 @ 16:17) (136/75 - 136/75)  RR: 16 (01-27-22 @ 16:17) (16 - 16)  SpO2: 98% (01-27-22 @ 16:17) (98% - 98%)  PHYSICAL EXAM:  GENERAL: NAD, lying in bed comfortably  HEAD/ENT: Traumatic, significant swelling and erythema of nose w/ significant purulence and some dried blood, nose tender to palpation diffusely with ecchymosis  EYES: significant swelling L > R w/ some purulence around nasal bridge, CN II-VI grossly intact  CHEST/LUNG: Clear to auscultation bilaterally; No rales, rhonchi, wheezing, or rubs. Unlabored respirations  HEART: Regular rate and rhythm; No murmurs, rubs, or gallops  ABDOMEN: Bowel sounds present; Soft, Nontender, Nondistended. No hepatomegally  EXTREMITIES:  2+ Peripheral Pulses, brisk capillary refill. No clubbing, cyanosis, or edema  NERVOUS SYSTEM:  Alert & Oriented X3, speech clear. No focal deficits   MSK: FROM all 4 extremities, full and equal strength

## 2022-01-27 NOTE — ED ADULT NURSE NOTE - NSIMPLEMENTINTERV_GEN_ALL_ED
Implemented All Fall with Harm Risk Interventions:  [place] to call system. Call bell, personal items and telephone within reach. Instruct patient to call for assistance. Room bathroom lighting operational. Non-slip footwear when patient is off stretcher. Physically safe environment: no spills, clutter or unnecessary equipment. Stretcher in lowest position, wheels locked, appropriate side rails in place. Provide visual cue, wrist band, yellow gown, etc. Monitor gait and stability. Monitor for mental status changes and reorient to person, place, and time. Review medications for side effects contributing to fall risk. Reinforce activity limits and safety measures with patient and family. Provide visual clues: red socks.

## 2022-01-27 NOTE — CONSULT NOTE ADULT - SUBJECTIVE AND OBJECTIVE BOX
HPI:  89yM PMH of HTN, DM2, depression AVR/MVR (on warfarin) sp fall and sustained facial trauma and nasal bone fractures 1/16/22 seen at Dr. Bowling (ENT) in the office today due to worsening swelling and redness and was sent to ED for infection and nasal abscess. Wife states that pt's nose has doubled in size with blistering on the outside skin and drainage. Denies fever chills n/v/d CP SOB HAs blurry vision neck pain.    Infectious Disease consult was called to evaluate pt and for antibiotic management    Past Medical & Surgical Hx:  PAST MEDICAL & SURGICAL HISTORY:  HTN (hypertension)  Type 2 diabetes mellitus  Major depression  Insomnia      Social History--  EtOH: denies   Tobacco: denies   Drug Use: denies     FAMILY HISTORY:  No pertinent family history in first degree relatives      Allergies  penicillin (Unknown)    Intolerances  NONE    Home Medications:  Ambien 5 mg oral tablet: 1 tab(s) orally once a day (at bedtime) (27 Jan 2022 18:12)  amLODIPine 5 mg oral tablet: 1 tab(s) orally once a day (27 Jan 2022 18:12)  citalopram 20 mg oral tablet: 1 tab(s) orally once a day (27 Jan 2022 18:12)  isosorbide mononitrate 30 mg oral tablet, extended release: 1 tab(s) orally once a day (in the morning) (27 Jan 2022 18:12)  losartan-hydrochlorothiazide 100 mg-25 mg oral tablet: 1 tab(s) orally once a day (27 Jan 2022 18:12)  metFORMIN 500 mg oral tablet, extended release: 4 tab(s) orally once a day (in the morning) (27 Jan 2022 18:12)  warfarin 2.5 mg oral tablet: 2 tab(s) orally Monday, Wednesday, and Friday (27 Jan 2022 18:12)  warfarin 2.5 mg oral tablet: 1 tab(s) orally Tuesday, Thursday, Saturday, Sunday (27 Jan 2022 18:12)      Current Inpatient Medications :    ANTIBIOTICS:       OTHER RELEVANT MEDICATIONS :  acetaminophen     Tablet .. 650 milliGRAM(s) Oral every 6 hours PRN  amLODIPine   Tablet 5 milliGRAM(s) Oral daily  citalopram 20 milliGRAM(s) Oral daily  dextrose 40% Gel 15 Gram(s) Oral once  dextrose 5%. 1000 milliLiter(s) IV Continuous <Continuous>  dextrose 5%. 1000 milliLiter(s) IV Continuous <Continuous>  dextrose 50% Injectable 25 Gram(s) IV Push once  dextrose 50% Injectable 12.5 Gram(s) IV Push once  dextrose 50% Injectable 25 Gram(s) IV Push once  glucagon  Injectable 1 milliGRAM(s) IntraMuscular once  insulin lispro (ADMELOG) corrective regimen sliding scale   SubCutaneous three times a day before meals  insulin lispro (ADMELOG) corrective regimen sliding scale   SubCutaneous at bedtime  isosorbide   mononitrate ER Tablet (IMDUR) 30 milliGRAM(s) Oral daily  melatonin 5 milliGRAM(s) Oral at bedtime PRN      ROS:  CONSTITUTIONAL:  Negative fever or chills  EYES:  Negative  blurry vision or double vision  CARDIOVASCULAR:  Negative for chest pain or palpitations  RESPIRATORY:  Negative for cough, wheezing, or SOB   GASTROINTESTINAL:  Negative for nausea, vomiting, diarrhea, constipation, or abdominal pain  GENITOURINARY:  Negative frequency, urgency , dysuria or hematuria   NEUROLOGIC:  No headache, confusion, dizziness, lightheadedness  All other systems were reviewed and are negative      Physical Exam:  Vital Signs Last 24 Hrs  T(C): 36.7 (27 Jan 2022 18:13), Max: 36.7 (27 Jan 2022 18:13)  T(F): 98 (27 Jan 2022 18:13), Max: 98 (27 Jan 2022 18:13)  HR: 84 (27 Jan 2022 18:47) (78 - 84)  BP: 144/75 (27 Jan 2022 18:13) (136/75 - 144/75)  RR: 18 (27 Jan 2022 18:47) (16 - 18)  SpO2: 98% (27 Jan 2022 18:47) (97% - 98%)  Height (cm): 190.5 (01-27 @ 16:17)  Weight (kg): 83.9 (01-27 @ 16:17)  BMI (kg/m2): 23.1 (01-27 @ 16:17)  BSA (m2): 2.12 (01-27 @ 16:17)    General: Awake alert +marked nasal swelling with redness pain and purulent drainage  Neck: supple, trachea midline  Lungs: clear, no wheeze/rhonchi  Cardiovascular: regular rate and rhythm, S1 S2  Abdomen: soft, nontender, ND, bowel sounds normal  Neurological:  alert and oriented x3  Skin: no rash  Extremities: no edema    Labs:                         11.1   16.29 )-----------( 209      ( 27 Jan 2022 15:50 )             32.2   01-27    131<L>  |  97  |  32<H>  ----------------------------<  313<H>  3.7   |  27  |  1.50<H>    Ca    8.6      27 Jan 2022 15:50    TPro  7.3  /  Alb  2.9<L>  /  TBili  0.7  /  DBili  x   /  AST  8<L>  /  ALT  16  /  AlkPhos  84  01-27      RECENT CULTURES:          RADIOLOGY & ADDITIONAL STUDIES:    ACC: [id number] EXAM:  XR CHEST PORTABLE URGENT 1V                          PROCEDURE DATE:  01/27/2022          INTERPRETATION:  Weakness.    AP chest. Prior 1/16/2022.    Median sternotomy cardiac valve surgery. The left ICD reidentified in   situ.No change cardiomegaly. No consolidation effusion or other   pleural-parenchymal abnormality    IMPRESSION: No acute findings. Stable exam      Assessment :   89yM PMH of HTN, DM2, depression AVR/MVR (on warfarin) sp fall and sustained facial trauma and nasal bone fractures 1/16/22 admitted with nasal abscess and cellulitis.      Plan :   Vancomycin and Cefepime for now  Fu cultures  ENT to see  May need I&D      Continue with present regiment .  Approptiate use of antibiotics and adverse effects reviewed.      I have discussed the above plan of care with patient and wife in detail. They expressed understanding of the treatment plan . Risks, benefits and alternatives discussed in detail. I have asked if they have any questions or concerns and appropriately addressed them to the best of my ability .      > 45 minutes spent in direct patient care reviewing  the notes, lab data/ imaging , discussion with multidisciplinary team. All questions were addressed and answered to the best of my capacity .    Thank you for allowing me to participate in the care of your patient .      Stacy Boyd MD  Infectious Disease  [phone]

## 2022-01-27 NOTE — H&P ADULT - ATTENDING COMMENTS
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 Patient fell 1 week ago and sustained facial trauma, was seen here, had nasal bone fractures and anterior nasal swelling admitted for nasal abscess.    HPI as above.     T(C): 36.7 (01-27-22 @ 18:13), Max: 36.7 (01-27-22 @ 18:13)  HR: 84 (01-27-22 @ 18:47) (78 - 84)  BP: 144/75 (01-27-22 @ 18:13) (136/75 - 144/75)  RR: 18 (01-27-22 @ 18:47) (16 - 18)  SpO2: 98% (01-27-22 @ 18:47) (97% - 98%)  Wt(kg): --    Physical Exam:   GENERAL: well-groomed, well-developed, NAD  HEENT: EOM intact, PERRLA, conjunctiva & sclera clear; hearing grossly intact, moist mucous membranes. Nasal swelling and erythema at nasal bridge and  dorsum nasi  NECK: supple, no JVD  RESPIRATORY: CTA B/L, no wheezing, rales, rhonchi or rubs  CARDIOVASCULAR: S1&S2, RRR, no murmurs or gallops  ABDOMEN: soft, non-tender, non-distended, + Bowel sounds x4 quadrants, no guarding, rebound or rigidity  MUSCULOSKELETAL:  no clubbing, cyanosis or edema of all 4 extremities  LYMPH: no cervical lymphadenopathy  VASCULAR: Radial pulses 2+ bilaterally, no varicose veins   SKIN: warm and dry, color normal  NEUROLOGIC: AA&O X3, CN2-12 intact w/ no focal deficits, no sensory loss, motor Strength 5/5 in UE & LE B/L, no deficit in peripheral vision  Psych: Normal mood and affect, normal behavior    Plan:  Nasal abscess: high suspicion for nasal abscess related to his recent facial fracture.   -continue IV vanco  -f/u cutlures, trend WBC count and monitor for fevers.   -f/u CT imaging.   -discussed with ENT who will see patient.     DM2: will need good glucose control in setting of his infection    Supratherapeutic INR: hold coumadin tonight. no indication for reversal of INR at this time.     Anemia: no signs or symptoms of active bleeding. Continue to monitor hgb.     ROBBIE on CKD: gentle IVF as ordered.   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 Patient fell 1 week ago and sustained facial trauma, was seen here, had nasal bone fractures and anterior nasal swelling admitted for nasal abscess.    HPI as above.     T(C): 36.7 (01-27-22 @ 18:13), Max: 36.7 (01-27-22 @ 18:13)  HR: 84 (01-27-22 @ 18:47) (78 - 84)  BP: 144/75 (01-27-22 @ 18:13) (136/75 - 144/75)  RR: 18 (01-27-22 @ 18:47) (16 - 18)  SpO2: 98% (01-27-22 @ 18:47) (97% - 98%)  Wt(kg): --    Physical Exam:   GENERAL: well-groomed, well-developed, NAD  HEENT: EOM intact, PERRLA, conjunctiva & sclera clear; hearing grossly intact, moist mucous membranes. Nasal swelling and erythema at nasal bridge and  dorsum nasi  NECK: supple, no JVD  RESPIRATORY: CTA B/L, no wheezing, rales, rhonchi or rubs  CARDIOVASCULAR: S1&S2, RRR, no murmurs or gallops  ABDOMEN: soft, non-tender, non-distended, + Bowel sounds x4 quadrants, no guarding, rebound or rigidity  MUSCULOSKELETAL:  no clubbing, cyanosis or edema of all 4 extremities  LYMPH: no cervical lymphadenopathy  VASCULAR: Radial pulses 2+ bilaterally, no varicose veins   SKIN: warm and dry, color normal  NEUROLOGIC: AA&O X3, CN2-12 intact w/ no focal deficits, no sensory loss, motor Strength 5/5 in UE & LE B/L, no deficit in peripheral vision  Psych: Normal mood and affect, normal behavior    Plan:  Nasal abscess: high suspicion for nasal abscess related to his recent facial fracture.   -continue IV vanco and vefepime  -f/u cutlures, trend WBC count and monitor for fevers.   -f/u CT imaging.   -discussed with ENT who will see patient.     DM2: will need good glucose control in setting of his infection    Supratherapeutic INR: hold coumadin tonight. no indication for reversal of INR at this time.     Anemia: no signs or symptoms of active bleeding. Continue to monitor hgb.     ROBBIE on CKD: gentle IVF as ordered.   -repeat BMP In AM  -monitor sodium levels    DVT ppx: SCD's    remainder as above

## 2022-01-27 NOTE — H&P ADULT - ASSESSMENT
***Charting in progress***  89yM with PMH of HTN, DM2, depression and ____ on warfarin, presenting from ENT office with possible nasal abscess.  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 Patient fell 1 week ago and sustained facial trauma, was seen here, had nasal bone fractures and anterior nasal swelling admitted for nasal abscess.

## 2022-01-27 NOTE — H&P ADULT - NSHPSOCIALHISTORY_GEN_ALL_CORE
Home: lives at home with wife  Smoke: 10 year 1 pk/year - quit 50 years ago  Alcohol: none  Recreational drug us: none  Ambulate: independent prior to fall  Activities of daily living: independent prior to fall  Job: retired  at hospitals

## 2022-01-27 NOTE — PATIENT PROFILE ADULT - FALL HARM RISK - HARM RISK INTERVENTIONS

## 2022-01-27 NOTE — ED ADULT NURSE NOTE - OBJECTIVE STATEMENT
Patient A/Ox4 with a steady gait. Comes in with swollen, erythemous nose and BL discharge from eyes. Patient states he fell about 1 week ago and fractured his nose in multiple places. Now has increased pain, redness, swelling and conjunctivitis. Is unsure of when the worsening began. Denies dizziness, SOB or difficulty breathing. IV and labs done. Covid swab done via oral mucosa 2/2 nasal injury. Call light in reach.

## 2022-01-27 NOTE — ED PROVIDER NOTE - PHYSICAL EXAMINATION
Gen:  alert, awake, no acute distress  HEENT:  severe swelling to the entire nose with external blistering and crusting of the skin with drainage  CV:  rrr, nl S1, S2, no m/r/g  Pulm:  lungs CTA b/l  Abd: s/nt/nd, +BS  Ext:  moving all extremities  Neuro:  grossly intact, no focal deficits  Skin:  clear, dry, intact  Psych: AOx3, normal affect, no apparent risk to self or others

## 2022-01-27 NOTE — H&P ADULT - PROBLEM SELECTOR PLAN 7
Patient is on zolpidem at home  Will give melatonin inpatient for now Chronic stable  - c/w home amlodipine and imdur with hold parameters  - hold home losartan-HCTZ in setting of ROBBIE and hyponatremia

## 2022-01-27 NOTE — ED PROVIDER NOTE - CLINICAL SUMMARY MEDICAL DECISION MAKING FREE TEXT BOX
pt with increased swelling to nose after trauma 1 week ago, sent in by ENT Dr. Bowling for work up of nasal abscess, IV abx and admission

## 2022-01-27 NOTE — CONSULT NOTE ADULT - ASSESSMENT
A/P: 89 yo M with nasoseptal abscess  -IV antibiotics, if no improvement will perform drainage in the OR  -Rec ID consult  -f/u CT scan    Adriano Bowling DO  [phone]

## 2022-01-27 NOTE — H&P ADULT - PROBLEM SELECTOR PLAN 2
Na 131 on admission without any encephalopathy  - Possibly due to HCTZ, hold while inpatient  - Monitor electrolytes with daily BMP Supratherapeutic INR of 5.53  - Hold warfarin for now   - F/u AM INR

## 2022-01-27 NOTE — H&P ADULT - PROBLEM SELECTOR PLAN 3
BUN/Cr of 32/1.5  - Unknown baseline   - S/p 1L NS  - gentle IVF  - Hold nephrotoxic agents  - Monitor renal indices BUN/Cr of 32/1.5  - Unknown baseline   - S/p 1L NS  - gentle IVF  - hold home HCTZ and losartan in setting of ROBBIE  - Hold nephrotoxic agents  - Monitor renal indices Na 131 on admission without any encephalopathy  - Possibly due to HCTZ, hold while inpatient  - Monitor electrolytes with daily BMP

## 2022-01-27 NOTE — H&P ADULT - PROBLEM SELECTOR PLAN 8
Continue citalopram   On PRN diazepam for anxiety, istop in chart note Continue citalopram Patient is on zolpidem at home, hold for now  Will give melatonin inpatient for now

## 2022-01-27 NOTE — H&P ADULT - NSHPOUTPATIENTPROVIDERS_GEN_ALL_CORE
PCP: Dr. Mohsen Hoang  ENT- Dr. Bowling PCP: Dr. Mohsen Hoang  ENT- Dr. Bowling  Cardio - [place] Dr. Cooper  Endocrine - ? PCP: Dr. Mohsen Hoang  ENT- Dr. Bowling  Cardio - [place] Dr. Arias

## 2022-01-27 NOTE — ED PROVIDER NOTE - OBJECTIVE STATEMENT
pt fell 1 week ago and sustained facial trauma, was seen here, had nasal bone fractures and anterior nasal swelling, pt was d/c to follow up with ENT in 2 days, but then pt developed chest pain and had a work up at [place] which was unremarkable but pt was not able to follow up with ENT.  Today pt seen Dr. Bowling (ENT) in the office and was sent to ED for possible nasal abscess.  wife states that pt's nose has doubled in size with blistering on the outside skin and drainage. no fevers reported.

## 2022-01-27 NOTE — H&P ADULT - NSICDXPASTMEDICALHX_GEN_ALL_CORE_FT
PAST MEDICAL HISTORY:  HTN (hypertension)     Insomnia     Major depression     Type 2 diabetes mellitus

## 2022-01-27 NOTE — CONSULT NOTE ADULT - SUBJECTIVE AND OBJECTIVE BOX
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 Patient fell 1 week ago and sustained facial trauma, was seen here, had nasal bone fractures and anterior nasal swelling. Was told to have closed reduction however he did not return my phone calls. Presented to my office today with nasal abscess.     Patient endorses significant worsening in nasal pain, swelling, and purulence over the past couple days. Endorses 2 day hx of worsening headache.Denies acute changes in vision. Endorses nasal congestion and generalized weakness. Patient endorses urinating well without dysuria, changes in urinary frequency or hematuria/pyuria. Patient dnies BM this AM but endorses last BM was regular without hematochezia/melena. Patient denies  fever, chills, tinnitus, cough, SOB, CP, palpitations, abdominal pain, n/v/c/d, confusion, dizziness, paresthesias, weakness, acute changes in vision or hearing, nasal purulence coming out of nares or focal neuro deficits.    ED course:  Vitals: T 97.5, HR 78, /75, RR 16, SpO2 98% on RA  Significant labs: WBC 16.29, H/H: 11.1/32.2, Na 131, Cr 1.5, BUN 32, Glucose 313  CXR: No acute findings. Stable exam  Patient received: 2g cefepime, 1g vancomycin, 1L NS bolus        Review of Systems:  Review of Systems: as per HPI      Allergies and Intolerances:        Allergies:  	penicillin: Drug, Unknown    Home Medications:   * Patient Currently Takes Medications as of 27-Jan-2022 18:06 documented in Structured Notes  · 	amLODIPine 5 mg oral tablet: Last Dose Taken:  , 1 tab(s) orally once a day  · 	losartan-hydrochlorothiazide 100 mg-25 mg oral tablet: Last Dose Taken:  , 1 tab(s) orally once a day  · 	Ambien 5 mg oral tablet: Last Dose Taken:  , 1 tab(s) orally once a day (at bedtime)  · 	warfarin 2.5 mg oral tablet: Last Dose Taken:  , 2 tab(s) orally Monday, Wednesday, and Friday  · 	warfarin 2.5 mg oral tablet: Last Dose Taken:  , 1 tab(s) orally Tuesday, Thursday, Saturday, Sunday  · 	isosorbide mononitrate 30 mg oral tablet, extended release: Last Dose Taken:  , 1 tab(s) orally once a day (in the morning)  · 	citalopram 20 mg oral tablet: Last Dose Taken:  , 1 tab(s) orally once a day  · 	metFORMIN 500 mg oral tablet, extended release: Last Dose Taken:  , 4 tab(s) orally once a day (in the morning)    Patient History:    Past Medical, Past Surgical, and Family History:  PAST MEDICAL HISTORY:  HTN (hypertension)     Insomnia     Major depression     Type 2 diabetes mellitus.     PAST SURGICAL HISTORY:  No significant past surgical history.     FAMILY HISTORY:  No pertinent family history in first degree relatives. No pertinent family history of: reports no fh.     Social History:  Social History (marital status, living situation, occupation, tobacco use, alcohol and drug use, and sexual history): Home: lives at home with wife  Smoke: 10 year 1 pk/year - quit 50 years ago  Alcohol: none  Recreational drug us: none  Ambulate: independent prior to fall  Activities of daily living: independent prior to fall  Job: retired  at [employer]     Tobacco Screening:  · Core Measure Site	Yes  · Has the patient used tobacco in the past 30 days?	No    Risk Assessment:    Present on Admission:  Deep Venous Thrombosis	no  Pulmonary Embolus	no     Heart Failure:  Does this patient have a history of or has been diagnosed with heart failure? no.    Physical Exam:   Physical Exam: T(C): 36.4 (01-27-22 @ 16:17), Max: 36.4 (01-27-22 @ 14:43)  HR: 78 (01-27-22 @ 16:17) (78 - 78)  BP: 136/75 (01-27-22 @ 16:17) (136/75 - 136/75)  RR: 16 (01-27-22 @ 16:17) (16 - 16)  SpO2: 98% (01-27-22 @ 16:17) (98% - 98%)  PHYSICAL EXAM:  GENERAL: NAD, lying in bed comfortably  HEAD/ENT: Traumatic, significant swelling and erythema of nose w/ significant purulence and some dried blood, nose tender to palpation diffusely with ecchymosis  EYES: significant swelling L > R w/ some purulence around nasal bridge, CN II-VI grossly intact  CHEST/LUNG: Clear to auscultation bilaterally; No rales, rhonchi, wheezing, or rubs. Unlabored respirations  HEART: Regular rate and rhythm; No murmurs, rubs, or gallops  ABDOMEN: Bowel sounds present; Soft, Nontender, Nondistended. No hepatomegally  EXTREMITIES:  2+ Peripheral Pulses, brisk capillary refill. No clubbing, cyanosis, or edema  NERVOUS SYSTEM:  Alert & Oriented X3, speech clear. No focal deficits   MSK: FROM all 4 extremities, full and equal strength

## 2022-01-27 NOTE — H&P ADULT - HISTORY OF PRESENT ILLNESS
**Charting in progress***  89yM with PMH of HTN, DM2, depression and ___ presenting with nasal abscess. Patient fell 1 week ago and sustained facial trauma, was seen here, had nasal bone fractures and anterior nasal swelling. Pt was discharged and told to follow up with ENT in 2 days, however he developed chest pain and had a work up at [place], which was unremarkable and was unable to follow up with ENT until today. Today pt saw Dr. Bowling (ENT) in the office and was sent to ED for possible nasal abscess. Wife states that pt's nose has doubled in size with blistering on the outside skin and drainage. Pt denies fevers.    ED course:  Vitals: T 97.5, HR 78, /75, RR 16, SpO2 98% on RA  Significant labs: WBC 16.29, H/H: 11.1/32.2, Na 131, Cr 1.5, BUN 32, Glucose 313  CXR: No acute findings. 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 Patient fell 1 week ago and sustained facial trauma, was seen here, had nasal bone fractures and anterior nasal swelling. Pt was discharged and told to follow up with ENT in 2 days, however he developed chest pain and had a work up at [place], which was unremarkable and was unable to follow up with ENT until today. Today pt saw Dr. Bowling (ENT) in the office and was sent to ED for possible nasal abscess. Wife states that pt's nose has doubled in size with blistering on the outside skin and drainage.     Patient endorses significant worsening in nasal pain, swelling, and purulence over the past couple days. Endorses 2 day hx of worsening headache. Endorses chronic hx of double vision unchanged from baseline and gradually worsening of blurry vision with the worsening facial swelling. Endorses nasal congestion and generalized weakness. Patient endorses urinating well without dysuria, changes in urinary frequency or hematuria/pyuria. Patient dnies BM this AM but endorses last BM was regular without hematochezia/melena. Patient denies headache, fever, chills, tinnitus, cough, SoB, CP, palpitations, abdominal pain, n/v/c/d, confusion, dizziness.    ED course:  Vitals: T 97.5, HR 78, /75, RR 16, SpO2 98% on RA  Significant labs: WBC 16.29, H/H: 11.1/32.2, Na 131, Cr 1.5, BUN 32, Glucose 313  CXR: No acute findings. 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 Patient fell 1 week ago and sustained facial trauma, was seen here, had nasal bone fractures and anterior nasal swelling. Pt was discharged and told to follow up with ENT in 2 days, however he developed chest pain and had a work up at [place], which was unremarkable and was unable to follow up with ENT until today. Today pt saw Dr. Bowling (ENT) in the office and was sent to ED for possible nasal abscess. Wife states that pt's nose has doubled in size with blistering on the outside skin and drainage.     Patient endorses significant worsening in nasal pain, swelling, and purulence over the past couple days. Endorses 2 day hx of worsening headache. Endorses chronic hx of double vision unchanged from baseline and gradually worsening of blurry vision with the worsening facial swelling. Endorses nasal congestion and generalized weakness. Patient endorses urinating well without dysuria, changes in urinary frequency or hematuria/pyuria. Patient dnies BM this AM but endorses last BM was regular without hematochezia/melena. Patient denies headache, fever, chills, tinnitus, cough, SOB, CP, palpitations, abdominal pain, n/v/c/d, confusion, dizziness, paresthesias, weakness, acute changes in vision, nasal purulence coming out of nares or focal deficits.    ED course:  Vitals: T 97.5, HR 78, /75, RR 16, SpO2 98% on RA  Significant labs: WBC 16.29, H/H: 11.1/32.2, Na 131, Cr 1.5, BUN 32, Glucose 313  CXR: No acute findings. 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 Patient fell 1 week ago and sustained facial trauma, was seen here, had nasal bone fractures and anterior nasal swelling. Pt was discharged and told to follow up with ENT in 2 days, however he developed chest pain and had a work up at [place], which was unremarkable and was unable to follow up with ENT until today. Today pt saw Dr. Bowling (ENT) in the office and was sent to ED for possible nasal abscess. Wife states that pt's nose has doubled in size with blistering on the outside skin and drainage.     Patient endorses significant worsening in nasal pain, swelling, and purulence over the past couple days. Endorses 2 day hx of worsening headache.Denies acute changes in vision. Endorses nasal congestion and generalized weakness. Patient endorses urinating well without dysuria, changes in urinary frequency or hematuria/pyuria. Patient dnies BM this AM but endorses last BM was regular without hematochezia/melena. Patient denies  fever, chills, tinnitus, cough, SOB, CP, palpitations, abdominal pain, n/v/c/d, confusion, dizziness, paresthesias, weakness, acute changes in vision or hearing, nasal purulence coming out of nares or focal neuro deficits.    ED course:  Vitals: T 97.5, HR 78, /75, RR 16, SpO2 98% on RA  Significant labs: WBC 16.29, H/H: 11.1/32.2, Na 131, Cr 1.5, BUN 32, Glucose 313  CXR: No acute findings. Stable exam  Patient received: 2g cefepime, 1g vancomycin, 1L NS bolus

## 2022-01-27 NOTE — CHART NOTE - NSCHARTNOTEFT_GEN_A_CORE
CT maxillofacial bones resulted showing acute comminuted, medially displaced fracture of the right nasal bone, acute, comminuted laterally displaced fracture of the left nasal bone, soft tissue swelling around the anterior bony nasal septum. Correlate for the presence of septal hematoma. Will make patient NPO after midnight incase surgical intervention may be needed. If plan is for closed reduction, can resume diet tomorrow.

## 2022-01-27 NOTE — H&P ADULT - PROBLEM SELECTOR PLAN 1
Patient s/p fall 1 week ago, with painful purulent erythematous enlarging nose sent in by ENT  - WBC 16.29  - S/p cefepime 2g IV and vancomycin 1g IV   - Patient will likely need to go to OR for abscess drainage  - NPO after midnight   - Gentle IVF only while NPO: 75mL/hr stop after 10 hours    - F/u blood cultures and abscess cultures  - F/u CT head   - F/u CT maxillofacial w IV  - ENT Dr. Bowling following  - ID consulted, Dr. Boyd Patient s/p fall 1 week ago, with painful purulent erythematous enlarging nose sent in by ENT  - WBC 16.29  - S/p cefepime 2g IV and vancomycin 1g IV   - c/w vanco 750 mg IV  - Patient will likely need to go to OR for abscess drainage  - NPO after midnight   - Gentle IVF only while NPO: 75mL/hr stop after 10 hours    - F/u blood cultures and abscess cultures  - F/u CT head   - F/u CT maxillofacial w IV  - ENT Dr. Bowling following  - ID consulted, Dr. Boyd Patient s/p fall 1 week ago, with painful purulent erythematous enlarging nose sent in by ENT, does not meet sepsis criteria  - WBC 16.29  - S/p cefepime 2g IV and vancomycin 1g IV   - c/w vanco 750 mg IV  - Patient will likely need to go to OR for abscess drainage  - NPO after midnight   - Gentle IVF only while NPO: 75mL/hr stop after 10 hours    - F/u blood cultures and abscess cultures  - F/u CT head   - F/u CT maxillofacial w IV  - ENT Dr. Bowling following  - Will consult Cardio for possible clearance hx of MVR, AVR, with supratherapeutic INR  - ID consulted, Dr. Boyd Patient s/p fall 1 week ago, with painful purulent erythematous enlarging nose sent in by ENT, does not meet sepsis criteria  - WBC 16.29  - S/p cefepime 2g IV and vancomycin 1g IV   - c/w vanco 750 mg IV  - Patient may need to go to OR for abscess drainage  - NPO after midnight   - Gentle IVF only while NPO: 75mL/hr stop after 10 hours    - F/u blood cultures and abscess cultures  - F/u CT head   - F/u CT maxillofacial w IV contrast  - ENT Dr. Bowling following  - Will consult Cardio for possible clearance hx of MVR, AVR, with supratherapeutic INR  - ID consulted, Dr. Boyd

## 2022-01-27 NOTE — H&P ADULT - PROBLEM SELECTOR PLAN 6
Chronic stable  Continue home medications of amlodipine, Chronic stable  - c/w home amlodipine  - hold home losartan-HCTZ in setting of ROBBIE and hyponatremia Hx of aortic and mitral valve replacement  On Warfarin 2.5mg, 2 tabs MWF and 1 tab rest of the days  - F/u STAT INR  - d/c warfarin for now in setting of potential ENT procedure and supratherapeutic INR  - f/up INR in AM  - No indication for reversal at this time  - Cardio consulted, f/u recs Hx of aortic and mitral valve replacement  On Warfarin 2.5mg, 2 tabs MWF and 1 tab rest of the days  - hold warfarin for now in setting of potential ENT procedure and supratherapeutic INR  - f/up repeat INR in AM  - No indication for reversal at this time  - Cardio consulted, f/u recs

## 2022-01-28 LAB
A1C WITH ESTIMATED AVERAGE GLUCOSE RESULT: 8 % — HIGH (ref 4–5.6)
ALBUMIN SERPL ELPH-MCNC: 2.5 G/DL — LOW (ref 3.3–5)
ALP SERPL-CCNC: 75 U/L — SIGNIFICANT CHANGE UP (ref 40–120)
ALT FLD-CCNC: 17 U/L — SIGNIFICANT CHANGE UP (ref 12–78)
ANION GAP SERPL CALC-SCNC: 9 MMOL/L — SIGNIFICANT CHANGE UP (ref 5–17)
APTT BLD: 49.9 SEC — HIGH (ref 27.5–35.5)
AST SERPL-CCNC: 12 U/L — LOW (ref 15–37)
BASOPHILS # BLD AUTO: 0.04 K/UL — SIGNIFICANT CHANGE UP (ref 0–0.2)
BASOPHILS NFR BLD AUTO: 0.3 % — SIGNIFICANT CHANGE UP (ref 0–2)
BILIRUB SERPL-MCNC: 0.6 MG/DL — SIGNIFICANT CHANGE UP (ref 0.2–1.2)
BUN SERPL-MCNC: 28 MG/DL — HIGH (ref 7–23)
CALCIUM SERPL-MCNC: 8.5 MG/DL — SIGNIFICANT CHANGE UP (ref 8.5–10.1)
CHLORIDE SERPL-SCNC: 103 MMOL/L — SIGNIFICANT CHANGE UP (ref 96–108)
CO2 SERPL-SCNC: 26 MMOL/L — SIGNIFICANT CHANGE UP (ref 22–31)
CREAT SERPL-MCNC: 1.2 MG/DL — SIGNIFICANT CHANGE UP (ref 0.5–1.3)
EOSINOPHIL # BLD AUTO: 0.09 K/UL — SIGNIFICANT CHANGE UP (ref 0–0.5)
EOSINOPHIL NFR BLD AUTO: 0.7 % — SIGNIFICANT CHANGE UP (ref 0–6)
ESTIMATED AVERAGE GLUCOSE: 183 MG/DL — HIGH (ref 68–114)
GLUCOSE SERPL-MCNC: 248 MG/DL — HIGH (ref 70–99)
HCT VFR BLD CALC: 31.2 % — LOW (ref 39–50)
HGB BLD-MCNC: 11 G/DL — LOW (ref 13–17)
IMM GRANULOCYTES NFR BLD AUTO: 0.7 % — SIGNIFICANT CHANGE UP (ref 0–1.5)
INR BLD: 3.23 RATIO — HIGH (ref 0.88–1.16)
INR BLD: 6.46 RATIO — CRITICAL HIGH (ref 0.88–1.16)
LYMPHOCYTES # BLD AUTO: 0.51 K/UL — LOW (ref 1–3.3)
LYMPHOCYTES # BLD AUTO: 4.2 % — LOW (ref 13–44)
MCHC RBC-ENTMCNC: 32.5 PG — SIGNIFICANT CHANGE UP (ref 27–34)
MCHC RBC-ENTMCNC: 35.3 GM/DL — SIGNIFICANT CHANGE UP (ref 32–36)
MCV RBC AUTO: 92.3 FL — SIGNIFICANT CHANGE UP (ref 80–100)
MONOCYTES # BLD AUTO: 0.81 K/UL — SIGNIFICANT CHANGE UP (ref 0–0.9)
MONOCYTES NFR BLD AUTO: 6.7 % — SIGNIFICANT CHANGE UP (ref 2–14)
NEUTROPHILS # BLD AUTO: 10.49 K/UL — HIGH (ref 1.8–7.4)
NEUTROPHILS NFR BLD AUTO: 87.4 % — HIGH (ref 43–77)
NRBC # BLD: 0 /100 WBCS — SIGNIFICANT CHANGE UP (ref 0–0)
PLATELET # BLD AUTO: 194 K/UL — SIGNIFICANT CHANGE UP (ref 150–400)
POTASSIUM SERPL-MCNC: 3.7 MMOL/L — SIGNIFICANT CHANGE UP (ref 3.5–5.3)
POTASSIUM SERPL-SCNC: 3.7 MMOL/L — SIGNIFICANT CHANGE UP (ref 3.5–5.3)
PROT SERPL-MCNC: 6.6 G/DL — SIGNIFICANT CHANGE UP (ref 6–8.3)
PROTHROM AB SERPL-ACNC: 35.7 SEC — HIGH (ref 10.6–13.6)
PROTHROM AB SERPL-ACNC: 69.2 SEC — HIGH (ref 10.6–13.6)
RBC # BLD: 3.38 M/UL — LOW (ref 4.2–5.8)
RBC # FLD: 13.2 % — SIGNIFICANT CHANGE UP (ref 10.3–14.5)
SODIUM SERPL-SCNC: 138 MMOL/L — SIGNIFICANT CHANGE UP (ref 135–145)
WBC # BLD: 12.03 K/UL — HIGH (ref 3.8–10.5)
WBC # FLD AUTO: 12.03 K/UL — HIGH (ref 3.8–10.5)

## 2022-01-28 PROCEDURE — 99223 1ST HOSP IP/OBS HIGH 75: CPT

## 2022-01-28 PROCEDURE — 99233 SBSQ HOSP IP/OBS HIGH 50: CPT

## 2022-01-28 RX ORDER — INSULIN LISPRO 100/ML
VIAL (ML) SUBCUTANEOUS AT BEDTIME
Refills: 0 | Status: DISCONTINUED | OUTPATIENT
Start: 2022-01-28 | End: 2022-01-29

## 2022-01-28 RX ORDER — PHYTONADIONE (VIT K1) 5 MG
5 TABLET ORAL ONCE
Refills: 0 | Status: COMPLETED | OUTPATIENT
Start: 2022-01-28 | End: 2022-01-28

## 2022-01-28 RX ORDER — INSULIN LISPRO 100/ML
VIAL (ML) SUBCUTANEOUS
Refills: 0 | Status: DISCONTINUED | OUTPATIENT
Start: 2022-01-28 | End: 2022-01-29

## 2022-01-28 RX ORDER — INSULIN LISPRO 100/ML
VIAL (ML) SUBCUTANEOUS EVERY 6 HOURS
Refills: 0 | Status: DISCONTINUED | OUTPATIENT
Start: 2022-01-28 | End: 2022-01-28

## 2022-01-28 RX ORDER — MUPIROCIN 20 MG/G
1 OINTMENT TOPICAL
Refills: 0 | Status: DISCONTINUED | OUTPATIENT
Start: 2022-01-28 | End: 2022-02-02

## 2022-01-28 RX ADMIN — Medication 1: at 22:16

## 2022-01-28 RX ADMIN — CEFEPIME 100 MILLIGRAM(S): 1 INJECTION, POWDER, FOR SOLUTION INTRAMUSCULAR; INTRAVENOUS at 05:27

## 2022-01-28 RX ADMIN — Medication 5 MILLIGRAM(S): at 14:27

## 2022-01-28 RX ADMIN — CEFEPIME 100 MILLIGRAM(S): 1 INJECTION, POWDER, FOR SOLUTION INTRAMUSCULAR; INTRAVENOUS at 18:22

## 2022-01-28 RX ADMIN — MUPIROCIN 1 APPLICATION(S): 20 OINTMENT TOPICAL at 21:53

## 2022-01-28 RX ADMIN — SODIUM CHLORIDE 75 MILLILITER(S): 9 INJECTION INTRAMUSCULAR; INTRAVENOUS; SUBCUTANEOUS at 00:34

## 2022-01-28 RX ADMIN — Medication 2: at 05:37

## 2022-01-28 RX ADMIN — ISOSORBIDE MONONITRATE 30 MILLIGRAM(S): 60 TABLET, EXTENDED RELEASE ORAL at 11:18

## 2022-01-28 RX ADMIN — Medication 2: at 17:03

## 2022-01-28 RX ADMIN — CITALOPRAM 20 MILLIGRAM(S): 10 TABLET, FILM COATED ORAL at 11:18

## 2022-01-28 RX ADMIN — Medication 4: at 12:15

## 2022-01-28 RX ADMIN — Medication 250 MILLIGRAM(S): at 16:59

## 2022-01-28 RX ADMIN — AMLODIPINE BESYLATE 5 MILLIGRAM(S): 2.5 TABLET ORAL at 05:26

## 2022-01-28 NOTE — CONSULT NOTE ADULT - ATTENDING COMMENTS
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 Cardiology consulted for pre operative clearance.     Patient has PPM paced in 2019, last interrogated August 2021. 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   Cardiac Cath preformed 2/2 to chronic chest pain, medical therapy recommended at the time       - EKG reveals no signs of plaque rupture   - patient meets 4 METs   - continue to hold coumadin but would restart as soon as clinically able  - patient is an intermediate risk for an intermediate risk procedure, but may proceed with planned procedure. He does not display any signs of plaque rupture or new arrythmia at this time

## 2022-01-28 NOTE — PRE-OP CHECKLIST - SELECT TESTS ORDERED
BMP/CBC/CMP/INR/Hepatic Function/EKG/CXR/COVID-19
CBC/CMP/PT/PTT/INR/Type and Screen/EKG/CXR/COVID-19

## 2022-01-28 NOTE — PROGRESS NOTE ADULT - ASSESSMENT
A/P: 89 yo M with nasoseptal abscess from facial trauma  -Cont IV antibiotics, f/u culture sensitivities  -Will monitor course, I&D if he stops improving  -Bactroban to nose bid  -INR supratherapeutic, hold and reverse coagulopathy

## 2022-01-28 NOTE — CONSULT NOTE ADULT - SUBJECTIVE AND OBJECTIVE BOX
Patient is a 89y old  Male who presents with a chief complaint of Possible Nasal abscess (27 Jan 2022 20:33)      HPI:  89yM with PMH of HTN, DM2, depression and hx of AVR/MVR (on warfarin) presenting with nasal abscess. Patient fell 1 week ago and sustained facial trauma, was seen here, had nasal bone fractures and anterior nasal swelling. Pt was discharged and told to follow up with ENT in 2 days, however he developed chest pain and had a work up at [place], which was unremarkable and was unable to follow up with ENT until today. Today pt saw Dr. Bowling (ENT) in the office and was sent to ED for possible nasal abscess. Wife states that pt's nose has doubled in size with blistering on the outside skin and drainage.     Patient endorses significant worsening in nasal pain, swelling, and purulence over the past couple days. Endorses 2 day hx of worsening headache. Denies acute changes in vision. Endorses nasal congestion and generalized weakness. Patient endorses urinating well without dysuria, changes in urinary frequency or hematuria/pyuria. Patient dnies BM this AM but endorses last BM was regular without hematochezia/melena. Patient denies  fever, chills, tinnitus, cough, SOB, CP, palpitations, abdominal pain, n/v/c/d, confusion, dizziness, paresthesias, weakness, acute changes in vision or hearing, nasal purulence coming out of nares or focal neuro deficits.    ED course:  Vitals: T 97.5, HR 78, /75, RR 16, SpO2 98% on RA  Significant labs: WBC 16.29, H/H: 11.1/32.2, Na 131, Cr 1.5, BUN 32, Glucose 313  CXR: No acute findings. Stable exam  Patient received: 2g cefepime, 1g vancomycin, 1L NS bolus    (27 Jan 2022 17:29)      Patient seen and examined at bedside. He has no c/o chest pain or shortness of breath. He c/o blood clots when he blows his nose.   He admits to chronic chest pain when walking more than half a block.   He denies history of coronary artery disease/MI/CVA/arrythmia.   He has a history of Aortic valve replacement reportedly in 2014 and history of mitral valve replacement in 2019. although no records are able to be obtained to verify.   He sees Dr. hogan and has had a stress test in the past but does not recall when.     EKG shows old LBBB present in 2021.     PAST MEDICAL & SURGICAL HISTORY:  HTN (hypertension)    Type 2 diabetes mellitus    Major depression    Insomnia    No significant past surgical history    MEDICATIONS  (STANDING):  amLODIPine   Tablet 5 milliGRAM(s) Oral daily  cefepime   IVPB 1000 milliGRAM(s) IV Intermittent every 12 hours  citalopram 20 milliGRAM(s) Oral daily  dextrose 40% Gel 15 Gram(s) Oral once  dextrose 5%. 1000 milliLiter(s) (50 mL/Hr) IV Continuous <Continuous>  dextrose 5%. 1000 milliLiter(s) (100 mL/Hr) IV Continuous <Continuous>  dextrose 50% Injectable 25 Gram(s) IV Push once  dextrose 50% Injectable 12.5 Gram(s) IV Push once  dextrose 50% Injectable 25 Gram(s) IV Push once  glucagon  Injectable 1 milliGRAM(s) IntraMuscular once  insulin lispro (ADMELOG) corrective regimen sliding scale   SubCutaneous every 6 hours  isosorbide   mononitrate ER Tablet (IMDUR) 30 milliGRAM(s) Oral daily  vancomycin  IVPB 750 milliGRAM(s) IV Intermittent every 24 hours    MEDICATIONS  (PRN):  acetaminophen     Tablet .. 650 milliGRAM(s) Oral every 6 hours PRN Temp greater or equal to 38C (100.4F), Mild Pain (1 - 3)  melatonin 5 milliGRAM(s) Oral at bedtime PRN Insomnia      FAMILY HISTORY:  No pertinent family history in first degree relatives      Denies Family history of CAD or early MI      Constitutional: denies fever, chills  HEENT: denies blurry vision, difficulty hearing  Respiratory: denies SOB, RAMIREZ, cough  Cardiovascular: denies CP, palpitations, orthopnea, PND, LE edema  Gastrointestinal: denies nausea, vomiting, abdominal pain  Genitourinary: denies urinary changes  Skin: Denies rashes, itching  Neurologic: denies headache, weakness, dizziness  Hematology/Oncology: denies bleeding, easy bruising  ROS negative except as noted above      SOCIAL HISTORY:    Home: lives at home with wife  Smoke: 10 year 1 pk/year - quit 50 years ago  Alcohol: none  Recreational drug us: none  Ambulate: independent prior to fall  Activities of daily living: independent prior to fall    Vital Signs Last 24 Hrs  T(C): 37.1 (28 Jan 2022 04:56), Max: 38.2 (27 Jan 2022 21:08)  T(F): 98.7 (28 Jan 2022 04:56), Max: 100.8 (27 Jan 2022 21:08)  HR: 68 (28 Jan 2022 04:56) (68 - 84)  BP: 145/81 (28 Jan 2022 04:56) (136/75 - 149/78)  BP(mean): --  RR: 17 (28 Jan 2022 04:56) (16 - 18)  SpO2: 95% (28 Jan 2022 04:56) (95% - 98%)    Physical Exam:  General: Well developed, well nourished, NAD  HEENT: + anterior nasal swelling, lacerations around anterior nares , moist mucous membranes   Neurology: A&Ox3  Respiratory: CTA B/L, No W/R/R  CV: RRR, +S1/S2, + murmurs rusb, no rubs or gallops  Abdominal: Soft, NT, ND +BSx4, no palpable masses  Extremities: No C/C/E, + peripheral pulses  MSK: Normal ROM, no joint erythema or warmth, no joint swelling   Skin: warm, dry, normal color      I&O's Detail    27 Jan 2022 07:01  -  28 Jan 2022 07:00  --------------------------------------------------------  IN:    IV PiggyBack: 50 mL    sodium chloride 0.9%: 450 mL  Total IN: 500 mL    OUT:    Voided (mL): 1075 mL  Total OUT: 1075 mL    Total NET: -575 mL          LABS:                        11.0   12.03 )-----------( 194      ( 28 Jan 2022 06:19 )             31.2     01-28    138  |  103  |  28<H>  ----------------------------<  248<H>  3.7   |  26  |  1.20    Ca    8.5      28 Jan 2022 06:19    TPro  6.6  /  Alb  2.5<L>  /  TBili  0.6  /  DBili  x   /  AST  12<L>  /  ALT  17  /  AlkPhos  75  01-28        PT/INR - ( 28 Jan 2022 06:19 )   PT: 69.2 sec;   INR: 6.46 ratio         PTT - ( 28 Jan 2022 06:19 )  PTT:49.9 sec    I&O's Summary    27 Jan 2022 07:01  -  28 Jan 2022 07:00  --------------------------------------------------------  IN: 500 mL / OUT: 1075 mL / NET: -575 mL       Patient is a 89y old  Male who presents with a chief complaint of Possible Nasal abscess (27 Jan 2022 20:33)      HPI:  89yM with PMH of HTN, DM2, depression and hx of AVR/MVR (on warfarin), paroxysmal atrial fibrillation, s/p PPM (2014 at [facility])  presenting with nasal abscess. Patient fell 1 week ago and sustained facial trauma, was seen here, had nasal bone fractures and anterior nasal swelling. Pt was discharged and told to follow up with ENT in 2 days, however he developed chest pain and had a work up at [place], which was unremarkable and was unable to follow up with ENT until today. Today pt saw Dr. Bowling (ENT) in the office and was sent to ED for possible nasal abscess. Wife states that pt's nose has doubled in size with blistering on the outside skin and drainage.     Patient endorses significant worsening in nasal pain, swelling, and purulence over the past couple days. Endorses 2 day hx of worsening headache. Denies acute changes in vision. Endorses nasal congestion and generalized weakness. Patient endorses urinating well without dysuria, changes in urinary frequency or hematuria/pyuria. Patient denies BM this AM but endorses last BM was regular without hematochezia/melena. Patient denies  fever, chills, tinnitus, cough, SOB, CP, palpitations, abdominal pain, n/v/c/d, confusion, dizziness, paresthesias, weakness, acute changes in vision or hearing, nasal purulence coming out of nares or focal neuro deficits.    ED course:  Vitals: T 97.5, HR 78, /75, RR 16, SpO2 98% on RA  Significant labs: WBC 16.29, H/H: 11.1/32.2, Na 131, Cr 1.5, BUN 32, Glucose 313  CXR: No acute findings. Stable exam  Patient received: 2g cefepime, 1g vancomycin, 1L NS bolus    (27 Jan 2022 17:29)      Patient seen and examined at bedside. He has no c/o chest pain or shortness of breath. He c/o blood clots when he blows his nose.   He admits to chronic chest pain when walking more than half a block.   He denies history of coronary artery disease/MI/CVA/arrythmia.   He has a history of Aortic valve replacement reportedly in 2014 and history of mitral valve replacement in 2019. although no records are able to be obtained to verify.   He sees Dr. hogan and has had a stress test in the past but does not recall when.     EKG v paced,   Tele: 3 beats V tach overnight, V paced with underlying atrial fibrillation     PAST MEDICAL & SURGICAL HISTORY:  HTN (hypertension)    Type 2 diabetes mellitus    Major depression    Insomnia    No significant past surgical history    MEDICATIONS  (STANDING):  amLODIPine   Tablet 5 milliGRAM(s) Oral daily  cefepime   IVPB 1000 milliGRAM(s) IV Intermittent every 12 hours  citalopram 20 milliGRAM(s) Oral daily  dextrose 40% Gel 15 Gram(s) Oral once  dextrose 5%. 1000 milliLiter(s) (50 mL/Hr) IV Continuous <Continuous>  dextrose 5%. 1000 milliLiter(s) (100 mL/Hr) IV Continuous <Continuous>  dextrose 50% Injectable 25 Gram(s) IV Push once  dextrose 50% Injectable 12.5 Gram(s) IV Push once  dextrose 50% Injectable 25 Gram(s) IV Push once  glucagon  Injectable 1 milliGRAM(s) IntraMuscular once  insulin lispro (ADMELOG) corrective regimen sliding scale   SubCutaneous every 6 hours  isosorbide   mononitrate ER Tablet (IMDUR) 30 milliGRAM(s) Oral daily  vancomycin  IVPB 750 milliGRAM(s) IV Intermittent every 24 hours    MEDICATIONS  (PRN):  acetaminophen     Tablet .. 650 milliGRAM(s) Oral every 6 hours PRN Temp greater or equal to 38C (100.4F), Mild Pain (1 - 3)  melatonin 5 milliGRAM(s) Oral at bedtime PRN Insomnia      FAMILY HISTORY:  No pertinent family history in first degree relatives      Denies Family history of CAD or early MI      Constitutional: denies fever, chills  HEENT: denies blurry vision, difficulty hearing  Respiratory: denies SOB, RAMIREZ, cough  Cardiovascular: denies CP, palpitations, orthopnea, PND, LE edema  Gastrointestinal: denies nausea, vomiting, abdominal pain  Genitourinary: denies urinary changes  Skin: Denies rashes, itching  Neurologic: denies headache, weakness, dizziness  Hematology/Oncology: denies bleeding, easy bruising  ROS negative except as noted above      SOCIAL HISTORY:    Home: lives at home with wife  Smoke: 10 year 1 pk/year - quit 50 years ago  Alcohol: none  Recreational drug us: none  Ambulate: independent prior to fall  Activities of daily living: independent prior to fall    Vital Signs Last 24 Hrs  T(C): 37.1 (28 Jan 2022 04:56), Max: 38.2 (27 Jan 2022 21:08)  T(F): 98.7 (28 Jan 2022 04:56), Max: 100.8 (27 Jan 2022 21:08)  HR: 68 (28 Jan 2022 04:56) (68 - 84)  BP: 145/81 (28 Jan 2022 04:56) (136/75 - 149/78)  BP(mean): --  RR: 17 (28 Jan 2022 04:56) (16 - 18)  SpO2: 95% (28 Jan 2022 04:56) (95% - 98%)    Physical Exam:  General: Well developed, well nourished, NAD  HEENT: + anterior nasal swelling, lacerations around anterior nares , moist mucous membranes   Neurology: A&Ox3  Respiratory: CTA B/L, No W/R/R  CV: RRR, +S1/S2, + murmurs rusb, no rubs or gallops  Abdominal: Soft, NT, ND +BSx4, no palpable masses  Extremities: No C/C/E, + peripheral pulses  MSK: Normal ROM, no joint erythema or warmth, no joint swelling   Skin: warm, dry, normal color      I&O's Detail    27 Jan 2022 07:01  -  28 Jan 2022 07:00  --------------------------------------------------------  IN:    IV PiggyBack: 50 mL    sodium chloride 0.9%: 450 mL  Total IN: 500 mL    OUT:    Voided (mL): 1075 mL  Total OUT: 1075 mL    Total NET: -575 mL          LABS:                        11.0   12.03 )-----------( 194      ( 28 Jan 2022 06:19 )             31.2     01-28    138  |  103  |  28<H>  ----------------------------<  248<H>  3.7   |  26  |  1.20    Ca    8.5      28 Jan 2022 06:19    TPro  6.6  /  Alb  2.5<L>  /  TBili  0.6  /  DBili  x   /  AST  12<L>  /  ALT  17  /  AlkPhos  75  01-28        PT/INR - ( 28 Jan 2022 06:19 )   PT: 69.2 sec;   INR: 6.46 ratio         PTT - ( 28 Jan 2022 06:19 )  PTT:49.9 sec    I&O's Summary    27 Jan 2022 07:01  -  28 Jan 2022 07:00  --------------------------------------------------------  IN: 500 mL / OUT: 1075 mL / NET: -575 mL       Patient is a 89y old  Male who presents with a chief complaint of Possible Nasal abscess (27 Jan 2022 20:33)      HPI:  89yM with PMH of HTN, DM2, depression and hx of AVR/MVR (on warfarin), paroxysmal atrial fibrillation, s/p PPM (2014 at [facility])  presenting with nasal abscess. Patient fell 1 week ago and sustained facial trauma, was seen here, had nasal bone fractures and anterior nasal swelling. Pt was discharged and told to follow up with ENT in 2 days, however he developed chest pain and had a work up at [place], which was unremarkable and was unable to follow up with ENT until today. Today pt saw Dr. Bowling (ENT) in the office and was sent to ED for possible nasal abscess. Wife states that pt's nose has doubled in size with blistering on the outside skin and drainage.     Patient endorses significant worsening in nasal pain, swelling, and purulence over the past couple days. Endorses 2 day hx of worsening headache. Denies acute changes in vision. Endorses nasal congestion and generalized weakness. Patient endorses urinating well without dysuria, changes in urinary frequency or hematuria/pyuria. Patient denies BM this AM but endorses last BM was regular without hematochezia/melena. Patient denies  fever, chills, tinnitus, cough, SOB, CP, palpitations, abdominal pain, n/v/c/d, confusion, dizziness, paresthesias, weakness, acute changes in vision or hearing, nasal purulence coming out of nares or focal neuro deficits.    ED course:  Vitals: T 97.5, HR 78, /75, RR 16, SpO2 98% on RA  Significant labs: WBC 16.29, H/H: 11.1/32.2, Na 131, Cr 1.5, BUN 32, Glucose 313  CXR: No acute findings. Stable exam  Patient received: 2g cefepime, 1g vancomycin, 1L NS bolus    (27 Jan 2022 17:29)      Patient seen and examined at bedside. He has no c/o chest pain or shortness of breath. He c/o blood clots when he blows his nose.   He admits to chronic chest pain when walking more than half a block.   He admits to history of A fib   He denies history of coronary artery disease/MI/CVA  He has a history of Aortic valve replacement reportedly in 2014 and history of mitral valve replacement in 2019. although no records are able to be obtained to verify.   He sees Dr. hogan and has had a stress test in the past but does not recall when.     EKG v paced,   Tele: 3 beats V tach overnight, V paced with underlying atrial fibrillation     PAST MEDICAL & SURGICAL HISTORY:  HTN (hypertension)    Type 2 diabetes mellitus    Major depression    Insomnia    No significant past surgical history    MEDICATIONS  (STANDING):  amLODIPine   Tablet 5 milliGRAM(s) Oral daily  cefepime   IVPB 1000 milliGRAM(s) IV Intermittent every 12 hours  citalopram 20 milliGRAM(s) Oral daily  dextrose 40% Gel 15 Gram(s) Oral once  dextrose 5%. 1000 milliLiter(s) (50 mL/Hr) IV Continuous <Continuous>  dextrose 5%. 1000 milliLiter(s) (100 mL/Hr) IV Continuous <Continuous>  dextrose 50% Injectable 25 Gram(s) IV Push once  dextrose 50% Injectable 12.5 Gram(s) IV Push once  dextrose 50% Injectable 25 Gram(s) IV Push once  glucagon  Injectable 1 milliGRAM(s) IntraMuscular once  insulin lispro (ADMELOG) corrective regimen sliding scale   SubCutaneous every 6 hours  isosorbide   mononitrate ER Tablet (IMDUR) 30 milliGRAM(s) Oral daily  vancomycin  IVPB 750 milliGRAM(s) IV Intermittent every 24 hours    MEDICATIONS  (PRN):  acetaminophen     Tablet .. 650 milliGRAM(s) Oral every 6 hours PRN Temp greater or equal to 38C (100.4F), Mild Pain (1 - 3)  melatonin 5 milliGRAM(s) Oral at bedtime PRN Insomnia      FAMILY HISTORY:  No pertinent family history in first degree relatives      Denies Family history of CAD or early MI      Constitutional: denies fever, chills  HEENT: denies blurry vision, difficulty hearing  Respiratory: denies SOB, RAMIREZ, cough  Cardiovascular: denies CP, palpitations, orthopnea, PND, LE edema  Gastrointestinal: denies nausea, vomiting, abdominal pain  Genitourinary: denies urinary changes  Skin: Denies rashes, itching  Neurologic: denies headache, weakness, dizziness  Hematology/Oncology: denies bleeding, easy bruising  ROS negative except as noted above      SOCIAL HISTORY:    Home: lives at home with wife  Smoke: 10 year 1 pk/year - quit 50 years ago  Alcohol: none  Recreational drug us: none  Ambulate: independent prior to fall  Activities of daily living: independent prior to fall    Vital Signs Last 24 Hrs  T(C): 37.1 (28 Jan 2022 04:56), Max: 38.2 (27 Jan 2022 21:08)  T(F): 98.7 (28 Jan 2022 04:56), Max: 100.8 (27 Jan 2022 21:08)  HR: 68 (28 Jan 2022 04:56) (68 - 84)  BP: 145/81 (28 Jan 2022 04:56) (136/75 - 149/78)  BP(mean): --  RR: 17 (28 Jan 2022 04:56) (16 - 18)  SpO2: 95% (28 Jan 2022 04:56) (95% - 98%)    Physical Exam:  General: Well developed, well nourished, NAD  HEENT: + anterior nasal swelling, lacerations around anterior nares , moist mucous membranes   Neurology: A&Ox3  Respiratory: CTA B/L, No W/R/R  CV: RRR, +S1/S2, + murmurs rusb, no rubs or gallops  Abdominal: Soft, NT, ND +BSx4, no palpable masses  Extremities: No C/C/E, + peripheral pulses  MSK: Normal ROM, no joint erythema or warmth, no joint swelling   Skin: warm, dry, normal color      I&O's Detail    27 Jan 2022 07:01  -  28 Jan 2022 07:00  --------------------------------------------------------  IN:    IV PiggyBack: 50 mL    sodium chloride 0.9%: 450 mL  Total IN: 500 mL    OUT:    Voided (mL): 1075 mL  Total OUT: 1075 mL    Total NET: -575 mL          LABS:                        11.0   12.03 )-----------( 194      ( 28 Jan 2022 06:19 )             31.2     01-28    138  |  103  |  28<H>  ----------------------------<  248<H>  3.7   |  26  |  1.20    Ca    8.5      28 Jan 2022 06:19    TPro  6.6  /  Alb  2.5<L>  /  TBili  0.6  /  DBili  x   /  AST  12<L>  /  ALT  17  /  AlkPhos  75  01-28        PT/INR - ( 28 Jan 2022 06:19 )   PT: 69.2 sec;   INR: 6.46 ratio         PTT - ( 28 Jan 2022 06:19 )  PTT:49.9 sec    I&O's Summary    27 Jan 2022 07:01  -  28 Jan 2022 07:00  --------------------------------------------------------  IN: 500 mL / OUT: 1075 mL / NET: -575 mL       Patient is a 89y old  Male who presents with a chief complaint of Possible Nasal abscess (27 Jan 2022 20:33)      HPI:  89yM with PMH of HTN, DM2, depression and hx of AVR/MVR (on warfarin), paroxysmal atrial fibrillation, s/p PPM (2014 at [facility])  presenting with nasal abscess. Patient fell 1 week ago and sustained facial trauma, was seen here, had nasal bone fractures and anterior nasal swelling. Pt was discharged and told to follow up with ENT in 2 days, however he developed chest pain and had a work up at [place], which was unremarkable and was unable to follow up with ENT until today. Today pt saw Dr. Bowling (ENT) in the office and was sent to ED for possible nasal abscess. Wife states that pt's nose has doubled in size with blistering on the outside skin and drainage.     Patient endorses significant worsening in nasal pain, swelling, and purulence over the past couple days. Endorses 2 day hx of worsening headache. Denies acute changes in vision. Endorses nasal congestion and generalized weakness. Patient endorses urinating well without dysuria, changes in urinary frequency or hematuria/pyuria. Patient denies BM this AM but endorses last BM was regular without hematochezia/melena. Patient denies  fever, chills, tinnitus, cough, SOB, CP, palpitations, abdominal pain, n/v/c/d, confusion, dizziness, paresthesias, weakness, acute changes in vision or hearing, nasal purulence coming out of nares or focal neuro deficits.    ED course:  Vitals: T 97.5, HR 78, /75, RR 16, SpO2 98% on RA  Significant labs: WBC 16.29, H/H: 11.1/32.2, Na 131, Cr 1.5, BUN 32, Glucose 313  CXR: No acute findings. Stable exam  Patient received: 2g cefepime, 1g vancomycin, 1L NS bolus    (27 Jan 2022 17:29)      Patient seen and examined at bedside. He has no c/o chest pain or shortness of breath. He c/o blood clots when he blows his nose.   He admits to chronic chest pain when walking more than half a block.   He admits to history of A fib   He denies history of coronary artery disease/MI/CVA  He has a history of Aortic valve replacement reportedly in 2014 and history of mitral valve replacement. although no records are able to be obtained to verify.   He sees Dr. hogan and has had a stress test in the past but does not recall when.     EKG v paced,   Tele: 3 beats V tach overnight, V paced with underlying atrial fibrillation     PAST MEDICAL & SURGICAL HISTORY:  HTN (hypertension)    Type 2 diabetes mellitus    Major depression    Insomnia    No significant past surgical history    MEDICATIONS  (STANDING):  amLODIPine   Tablet 5 milliGRAM(s) Oral daily  cefepime   IVPB 1000 milliGRAM(s) IV Intermittent every 12 hours  citalopram 20 milliGRAM(s) Oral daily  dextrose 40% Gel 15 Gram(s) Oral once  dextrose 5%. 1000 milliLiter(s) (50 mL/Hr) IV Continuous <Continuous>  dextrose 5%. 1000 milliLiter(s) (100 mL/Hr) IV Continuous <Continuous>  dextrose 50% Injectable 25 Gram(s) IV Push once  dextrose 50% Injectable 12.5 Gram(s) IV Push once  dextrose 50% Injectable 25 Gram(s) IV Push once  glucagon  Injectable 1 milliGRAM(s) IntraMuscular once  insulin lispro (ADMELOG) corrective regimen sliding scale   SubCutaneous every 6 hours  isosorbide   mononitrate ER Tablet (IMDUR) 30 milliGRAM(s) Oral daily  vancomycin  IVPB 750 milliGRAM(s) IV Intermittent every 24 hours    MEDICATIONS  (PRN):  acetaminophen     Tablet .. 650 milliGRAM(s) Oral every 6 hours PRN Temp greater or equal to 38C (100.4F), Mild Pain (1 - 3)  melatonin 5 milliGRAM(s) Oral at bedtime PRN Insomnia      FAMILY HISTORY:  No pertinent family history in first degree relatives      Denies Family history of CAD or early MI      Constitutional: denies fever, chills  HEENT: denies blurry vision, difficulty hearing  Respiratory: denies SOB, RAMIREZ, cough  Cardiovascular: denies CP, palpitations, orthopnea, PND, LE edema  Gastrointestinal: denies nausea, vomiting, abdominal pain  Genitourinary: denies urinary changes  Skin: Denies rashes, itching  Neurologic: denies headache, weakness, dizziness  Hematology/Oncology: denies bleeding, easy bruising  ROS negative except as noted above      SOCIAL HISTORY:    Home: lives at home with wife  Smoke: 10 year 1 pk/year - quit 50 years ago  Alcohol: none  Recreational drug us: none  Ambulate: independent prior to fall  Activities of daily living: independent prior to fall    Vital Signs Last 24 Hrs  T(C): 37.1 (28 Jan 2022 04:56), Max: 38.2 (27 Jan 2022 21:08)  T(F): 98.7 (28 Jan 2022 04:56), Max: 100.8 (27 Jan 2022 21:08)  HR: 68 (28 Jan 2022 04:56) (68 - 84)  BP: 145/81 (28 Jan 2022 04:56) (136/75 - 149/78)  BP(mean): --  RR: 17 (28 Jan 2022 04:56) (16 - 18)  SpO2: 95% (28 Jan 2022 04:56) (95% - 98%)    Physical Exam:  General: Well developed, well nourished, NAD  HEENT: + anterior nasal swelling, lacerations around anterior nares , moist mucous membranes   Neurology: A&Ox3  Respiratory: CTA B/L, No W/R/R  CV: RRR, +S1/S2, + murmurs rusb, no rubs or gallops  Abdominal: Soft, NT, ND +BSx4, no palpable masses  Extremities: No C/C/E, + peripheral pulses  MSK: Normal ROM, no joint erythema or warmth, no joint swelling   Skin: warm, dry, normal color      I&O's Detail    27 Jan 2022 07:01  -  28 Jan 2022 07:00  --------------------------------------------------------  IN:    IV PiggyBack: 50 mL    sodium chloride 0.9%: 450 mL  Total IN: 500 mL    OUT:    Voided (mL): 1075 mL  Total OUT: 1075 mL    Total NET: -575 mL          LABS:                        11.0   12.03 )-----------( 194      ( 28 Jan 2022 06:19 )             31.2     01-28    138  |  103  |  28<H>  ----------------------------<  248<H>  3.7   |  26  |  1.20    Ca    8.5      28 Jan 2022 06:19    TPro  6.6  /  Alb  2.5<L>  /  TBili  0.6  /  DBili  x   /  AST  12<L>  /  ALT  17  /  AlkPhos  75  01-28        PT/INR - ( 28 Jan 2022 06:19 )   PT: 69.2 sec;   INR: 6.46 ratio         PTT - ( 28 Jan 2022 06:19 )  PTT:49.9 sec    I&O's Summary    27 Jan 2022 07:01  -  28 Jan 2022 07:00  --------------------------------------------------------  IN: 500 mL / OUT: 1075 mL / NET: -575 mL

## 2022-01-28 NOTE — CONSULT NOTE ADULT - ASSESSMENT
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 Cardiology consulted for pre operative clearance.     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 Cardiology consulted for pre operative clearance.     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 Cardiology consulted for pre operative clearance.         - EKG reveals no signs of plaque rupture   - patient meets 4 METs   - patient noted to have supratherapeutic INR, warfarin held on admission, vitamin K reversal prior to planned procedure   - continue to hold coumadin but would restart as soon as clinically able  - placed request to [facility] for medical records   - patient is an intermediate risk for an intermediate risk procedure, but may proceed with planned procedure. 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 Cardiology consulted for pre operative clearance.         - EKG reveals no signs of plaque rupture   - patient meets 4 METs   - continue to hold coumadin but would restart as soon as clinically able  - placed request to [facility] for medical records   - patient is an intermediate risk for an intermediate risk procedure, but may proceed with planned procedure. 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 Cardiology consulted for pre operative clearance.     Patient has PPM paced in 2019, last interrogated August 2021. 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   Cardiac Cath preformed 2/2 to chronic chest pain, medical therapy recommended at the time       - EKG reveals no signs of plaque rupture   - patient meets 4 METs   - continue to hold coumadin but would restart as soon as clinically able  - patient is an intermediate risk for an intermediate risk procedure, but may proceed with planned procedure. He does not display any signs of plaque rupture or new arrythmia at this time

## 2022-01-28 NOTE — PROGRESS NOTE ADULT - ASSESSMENT
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 Patient fell 1 week ago and sustained facial trauma, was seen here, had nasal bone fractures and anterior nasal swelling admitted for nasal abscess.    1/28/22 - INR continues to rise, 6.46, d/w pharmacy will give 5 VitK for reversal -- recheck INR 10pm tonight, may need another 5 mg overnight -- recheck INR in AM, would prefer to get to therapeutic level if going to OR in AM -- possible OR pending clinical course per ENT recs. Clinically improved today per ENT, WBC improved. temp noted.     *called wife Roxanne, no answer.

## 2022-01-29 LAB
ALBUMIN SERPL ELPH-MCNC: 2.5 G/DL — LOW (ref 3.3–5)
ALP SERPL-CCNC: 75 U/L — SIGNIFICANT CHANGE UP (ref 40–120)
ALT FLD-CCNC: 25 U/L — SIGNIFICANT CHANGE UP (ref 12–78)
ANION GAP SERPL CALC-SCNC: 6 MMOL/L — SIGNIFICANT CHANGE UP (ref 5–17)
APTT BLD: 34.6 SEC — SIGNIFICANT CHANGE UP (ref 27.5–35.5)
APTT BLD: 56.3 SEC — HIGH (ref 27.5–35.5)
AST SERPL-CCNC: 23 U/L — SIGNIFICANT CHANGE UP (ref 15–37)
BASOPHILS # BLD AUTO: 0.03 K/UL — SIGNIFICANT CHANGE UP (ref 0–0.2)
BASOPHILS NFR BLD AUTO: 0.4 % — SIGNIFICANT CHANGE UP (ref 0–2)
BILIRUB SERPL-MCNC: 0.7 MG/DL — SIGNIFICANT CHANGE UP (ref 0.2–1.2)
BUN SERPL-MCNC: 33 MG/DL — HIGH (ref 7–23)
CALCIUM SERPL-MCNC: 8.4 MG/DL — LOW (ref 8.5–10.1)
CHLORIDE SERPL-SCNC: 104 MMOL/L — SIGNIFICANT CHANGE UP (ref 96–108)
CO2 SERPL-SCNC: 26 MMOL/L — SIGNIFICANT CHANGE UP (ref 22–31)
CREAT SERPL-MCNC: 1.4 MG/DL — HIGH (ref 0.5–1.3)
EOSINOPHIL # BLD AUTO: 0.15 K/UL — SIGNIFICANT CHANGE UP (ref 0–0.5)
EOSINOPHIL NFR BLD AUTO: 2 % — SIGNIFICANT CHANGE UP (ref 0–6)
GLUCOSE SERPL-MCNC: 241 MG/DL — HIGH (ref 70–99)
HCT VFR BLD CALC: 28.2 % — LOW (ref 39–50)
HCT VFR BLD CALC: 28.9 % — LOW (ref 39–50)
HGB BLD-MCNC: 10.2 G/DL — LOW (ref 13–17)
HGB BLD-MCNC: 9.9 G/DL — LOW (ref 13–17)
IMM GRANULOCYTES NFR BLD AUTO: 0.7 % — SIGNIFICANT CHANGE UP (ref 0–1.5)
INR BLD: 1.92 RATIO — HIGH (ref 0.88–1.16)
LYMPHOCYTES # BLD AUTO: 0.81 K/UL — LOW (ref 1–3.3)
LYMPHOCYTES # BLD AUTO: 10.7 % — LOW (ref 13–44)
MCHC RBC-ENTMCNC: 32.2 PG — SIGNIFICANT CHANGE UP (ref 27–34)
MCHC RBC-ENTMCNC: 32.5 PG — SIGNIFICANT CHANGE UP (ref 27–34)
MCHC RBC-ENTMCNC: 35.1 GM/DL — SIGNIFICANT CHANGE UP (ref 32–36)
MCHC RBC-ENTMCNC: 35.3 GM/DL — SIGNIFICANT CHANGE UP (ref 32–36)
MCV RBC AUTO: 91.2 FL — SIGNIFICANT CHANGE UP (ref 80–100)
MCV RBC AUTO: 92.5 FL — SIGNIFICANT CHANGE UP (ref 80–100)
MONOCYTES # BLD AUTO: 0.61 K/UL — SIGNIFICANT CHANGE UP (ref 0–0.9)
MONOCYTES NFR BLD AUTO: 8.1 % — SIGNIFICANT CHANGE UP (ref 2–14)
NEUTROPHILS # BLD AUTO: 5.9 K/UL — SIGNIFICANT CHANGE UP (ref 1.8–7.4)
NEUTROPHILS NFR BLD AUTO: 78.1 % — HIGH (ref 43–77)
NRBC # BLD: 0 /100 WBCS — SIGNIFICANT CHANGE UP (ref 0–0)
NRBC # BLD: 0 /100 WBCS — SIGNIFICANT CHANGE UP (ref 0–0)
PLATELET # BLD AUTO: 198 K/UL — SIGNIFICANT CHANGE UP (ref 150–400)
PLATELET # BLD AUTO: 205 K/UL — SIGNIFICANT CHANGE UP (ref 150–400)
POTASSIUM SERPL-MCNC: 3.4 MMOL/L — LOW (ref 3.5–5.3)
POTASSIUM SERPL-SCNC: 3.4 MMOL/L — LOW (ref 3.5–5.3)
PROT SERPL-MCNC: 6.5 G/DL — SIGNIFICANT CHANGE UP (ref 6–8.3)
PROTHROM AB SERPL-ACNC: 21.8 SEC — HIGH (ref 10.6–13.6)
RBC # BLD: 3.05 M/UL — LOW (ref 4.2–5.8)
RBC # BLD: 3.17 M/UL — LOW (ref 4.2–5.8)
RBC # FLD: 12.9 % — SIGNIFICANT CHANGE UP (ref 10.3–14.5)
RBC # FLD: 13 % — SIGNIFICANT CHANGE UP (ref 10.3–14.5)
SODIUM SERPL-SCNC: 136 MMOL/L — SIGNIFICANT CHANGE UP (ref 135–145)
WBC # BLD: 7.55 K/UL — SIGNIFICANT CHANGE UP (ref 3.8–10.5)
WBC # BLD: 7.58 K/UL — SIGNIFICANT CHANGE UP (ref 3.8–10.5)
WBC # FLD AUTO: 7.55 K/UL — SIGNIFICANT CHANGE UP (ref 3.8–10.5)
WBC # FLD AUTO: 7.58 K/UL — SIGNIFICANT CHANGE UP (ref 3.8–10.5)

## 2022-01-29 PROCEDURE — 99233 SBSQ HOSP IP/OBS HIGH 50: CPT

## 2022-01-29 PROCEDURE — 99232 SBSQ HOSP IP/OBS MODERATE 35: CPT

## 2022-01-29 RX ORDER — HEPARIN SODIUM 5000 [USP'U]/ML
6500 INJECTION INTRAVENOUS; SUBCUTANEOUS ONCE
Refills: 0 | Status: COMPLETED | OUTPATIENT
Start: 2022-01-29 | End: 2022-01-29

## 2022-01-29 RX ORDER — HEPARIN SODIUM 5000 [USP'U]/ML
INJECTION INTRAVENOUS; SUBCUTANEOUS
Qty: 25000 | Refills: 0 | Status: DISCONTINUED | OUTPATIENT
Start: 2022-01-29 | End: 2022-02-02

## 2022-01-29 RX ORDER — HYDROGEN PEROXIDE 0.3 KG/100L
1 LIQUID TOPICAL
Refills: 0 | Status: DISCONTINUED | OUTPATIENT
Start: 2022-01-29 | End: 2022-02-02

## 2022-01-29 RX ORDER — INSULIN LISPRO 100/ML
VIAL (ML) SUBCUTANEOUS
Refills: 0 | Status: DISCONTINUED | OUTPATIENT
Start: 2022-01-29 | End: 2022-02-01

## 2022-01-29 RX ORDER — ZOLPIDEM TARTRATE 10 MG/1
5 TABLET ORAL ONCE
Refills: 0 | Status: DISCONTINUED | OUTPATIENT
Start: 2022-01-29 | End: 2022-01-29

## 2022-01-29 RX ORDER — HEPARIN SODIUM 5000 [USP'U]/ML
6500 INJECTION INTRAVENOUS; SUBCUTANEOUS EVERY 6 HOURS
Refills: 0 | Status: DISCONTINUED | OUTPATIENT
Start: 2022-01-29 | End: 2022-02-02

## 2022-01-29 RX ORDER — INSULIN LISPRO 100/ML
3 VIAL (ML) SUBCUTANEOUS
Refills: 0 | Status: DISCONTINUED | OUTPATIENT
Start: 2022-01-29 | End: 2022-02-01

## 2022-01-29 RX ORDER — POTASSIUM CHLORIDE 20 MEQ
40 PACKET (EA) ORAL ONCE
Refills: 0 | Status: COMPLETED | OUTPATIENT
Start: 2022-01-29 | End: 2022-01-29

## 2022-01-29 RX ORDER — SODIUM CHLORIDE 9 MG/ML
1000 INJECTION INTRAMUSCULAR; INTRAVENOUS; SUBCUTANEOUS
Refills: 0 | Status: DISCONTINUED | OUTPATIENT
Start: 2022-01-29 | End: 2022-02-02

## 2022-01-29 RX ORDER — HEPARIN SODIUM 5000 [USP'U]/ML
3000 INJECTION INTRAVENOUS; SUBCUTANEOUS EVERY 6 HOURS
Refills: 0 | Status: DISCONTINUED | OUTPATIENT
Start: 2022-01-29 | End: 2022-02-02

## 2022-01-29 RX ORDER — OFLOXACIN 0.3 %
1 DROPS OPHTHALMIC (EYE)
Refills: 0 | Status: DISCONTINUED | OUTPATIENT
Start: 2022-01-29 | End: 2022-02-02

## 2022-01-29 RX ADMIN — Medication 1 DROP(S): at 17:58

## 2022-01-29 RX ADMIN — Medication 2: at 07:54

## 2022-01-29 RX ADMIN — Medication 3 UNIT(S): at 17:54

## 2022-01-29 RX ADMIN — Medication 4: at 11:52

## 2022-01-29 RX ADMIN — Medication 250 MILLIGRAM(S): at 15:51

## 2022-01-29 RX ADMIN — CEFEPIME 100 MILLIGRAM(S): 1 INJECTION, POWDER, FOR SOLUTION INTRAMUSCULAR; INTRAVENOUS at 05:15

## 2022-01-29 RX ADMIN — ZOLPIDEM TARTRATE 5 MILLIGRAM(S): 10 TABLET ORAL at 21:59

## 2022-01-29 RX ADMIN — Medication 4: at 16:30

## 2022-01-29 RX ADMIN — HYDROGEN PEROXIDE 1 APPLICATION(S): 0.3 LIQUID TOPICAL at 17:57

## 2022-01-29 RX ADMIN — HEPARIN SODIUM 6500 UNIT(S): 5000 INJECTION INTRAVENOUS; SUBCUTANEOUS at 12:41

## 2022-01-29 RX ADMIN — HEPARIN SODIUM 1500 UNIT(S)/HR: 5000 INJECTION INTRAVENOUS; SUBCUTANEOUS at 12:33

## 2022-01-29 RX ADMIN — MUPIROCIN 1 APPLICATION(S): 20 OINTMENT TOPICAL at 05:15

## 2022-01-29 RX ADMIN — HEPARIN SODIUM 1700 UNIT(S)/HR: 5000 INJECTION INTRAVENOUS; SUBCUTANEOUS at 18:59

## 2022-01-29 RX ADMIN — MUPIROCIN 1 APPLICATION(S): 20 OINTMENT TOPICAL at 17:58

## 2022-01-29 RX ADMIN — Medication 5 MILLIGRAM(S): at 01:27

## 2022-01-29 RX ADMIN — HEPARIN SODIUM 3000 UNIT(S): 5000 INJECTION INTRAVENOUS; SUBCUTANEOUS at 19:01

## 2022-01-29 RX ADMIN — CEFEPIME 100 MILLIGRAM(S): 1 INJECTION, POWDER, FOR SOLUTION INTRAMUSCULAR; INTRAVENOUS at 17:57

## 2022-01-29 RX ADMIN — CITALOPRAM 20 MILLIGRAM(S): 10 TABLET, FILM COATED ORAL at 11:53

## 2022-01-29 RX ADMIN — Medication 3: at 21:58

## 2022-01-29 RX ADMIN — Medication 40 MILLIEQUIVALENT(S): at 09:59

## 2022-01-29 RX ADMIN — AMLODIPINE BESYLATE 5 MILLIGRAM(S): 2.5 TABLET ORAL at 05:15

## 2022-01-29 RX ADMIN — SODIUM CHLORIDE 50 MILLILITER(S): 9 INJECTION INTRAMUSCULAR; INTRAVENOUS; SUBCUTANEOUS at 09:59

## 2022-01-29 NOTE — PROGRESS NOTE ADULT - ASSESSMENT
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 Cardiology consulted for pre operative clearance.     Patient has PPM placed in 2019, last interrogated August 2021. 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   Cardiac Cath preformed 2/2 to chronic chest pain, medical therapy recommended at the time     Supratherapeutic INR/? Mechanical MVR/AVR/Cardiac Optimization  - Would not reverse INR.  However, got Vit K 5 mg PO x 1 for INR of 5.53 -->6.46.    - Today's INR = 1.98.  Continue Heparin gtt.  Can hold temporarily as deemed necessary prior to nasal abscess I & D.  Resume dosing Coumadin postop once cleared by ENT  - EKG reveals no signs of plaque rupture   - No evidence of ischemia, significant cardiac murmur, cardiac arrhythmia or volume overload.  - He is considered an intermediate risk for an intermediate risk procedure, but may proceed with planned procedure. He has no modifiable risk factors at this time  - Would obtain interrogation report.    Kim Fung DNP, NP-C  Cardiology   Spectra #[phone]        89yM with PMH of HTN, DM2, depression and hx of AVR/MVR, Afib (on warfarin), admitted for nasal abscess requiring IV antibiotics, and nasal fracture. Cardiology consulted for pre operative clearance.     Patient has PPM placed in 2019 for SSS, last interrogated August 2021.  Adelso life: 9.3-11.2 yrs.  Report on chart.  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   Cardiac Cath preformed 2/2 to chronic chest pain, medical therapy recommended at the time     Supratherapeutic INR/? Mechanical MVR/AVR/Afib/Cardiac Optimization  - s/p Vit K 5 mg PO x 1 for INR of 5.53 -->6.46.    - Today's INR = 1.98.  Continue Heparin gtt.  Can hold temporarily as deemed necessary prior to nasal abscess I & D.  Resume dosing Coumadin postop once cleared by ENT  - EKG reveals no signs of plaque rupture; Vpaced.    - Monitor electrolytes, replete to keep K>4 and Mag>2  - No evidence of ischemia, significant cardiac murmur, cardiac arrhythmia or volume overload.  - He is considered an intermediate risk for an intermediate risk procedure, but may proceed with planned procedure. He has no modifiable risk factors at this time  - Cardiac records from Dr. Raygoza on chart.    Kim Fung DNP, NP-C  Cardiology   Spectra #[phone]

## 2022-01-29 NOTE — PROGRESS NOTE ADULT - ASSESSMENT
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 Patient fell 1 week ago and sustained facial trauma, was seen here, had nasal bone fractures and anterior nasal swelling admitted for nasal abscess.    1/28/22 - INR continues to rise, 6.46, d/w pharmacy will give 5 VitK for reversal -- recheck INR 10pm tonight, may need another 5 mg overnight -- recheck INR in AM, would prefer to get to therapeutic level if going to OR in AM -- possible OR pending clinical course per ENT recs. Clinically improved today per ENT, WBC improved. temp noted.     *called wife Roxanne, no answer.     1/29/22 - INR 1.92 -- will d/w pharmacy whether to bridge w/ heparin for time being and resume coumadin after OR potentially or continue w/ warfarin therapy to goal along w/ heparin. Afebrile overnight, WBC resolved, abscess Cx prelim growing strep pyogenes. C/w Vanco.  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 Patient fell 1 week ago and sustained facial trauma, was seen here, had nasal bone fractures and anterior nasal swelling admitted for nasal abscess.    1/28/22 - INR continues to rise, 6.46, d/w pharmacy will give 5 VitK for reversal -- recheck INR 10pm tonight, may need another 5 mg overnight -- recheck INR in AM, would prefer to get to therapeutic level if going to OR in AM -- possible OR pending clinical course per ENT recs. Clinically improved today per ENT, WBC improved. temp noted.     *called wife Roxanne, no answer.     1/29/22 - INR 1.92 -- will bridge w/ heparin for time being and resume coumadin after OR, if surgery is warranted. Afebrile overnight, WBC resolved, abscess Cx prelim growing strep pyogenes. C/w Vanco. added premeal admelog 3 units AC for tighter glucose control.

## 2022-01-29 NOTE — CHART NOTE - NSCHARTNOTEFT_GEN_A_CORE
Called by RN as patient is feeling anxious. At home as per patient med review, pt is prescribed valium PRN for anxiety and ambien for insomnia. Pt seen and examined at bedside. Pt requested home ambien as it works well for him. Ambien 5mg x 1 ordered.

## 2022-01-29 NOTE — PROGRESS NOTE ADULT - ASSESSMENT
A/P: 89 yo M open nasal fractures which progressed to nasoseptal abscess  -Limited I&D performed in my office on 1/27  -Cont IV antibiotics, group A step  -Potential I&D in the OR tomorrow or Monday pending no improvement  -Call immediately if patient clinically worsens or have neuro status changes    Adriano Bowling DO  [facility]  [phone]

## 2022-01-30 LAB
ALBUMIN SERPL ELPH-MCNC: 2.5 G/DL — LOW (ref 3.3–5)
ALP SERPL-CCNC: 71 U/L — SIGNIFICANT CHANGE UP (ref 40–120)
ALT FLD-CCNC: 24 U/L — SIGNIFICANT CHANGE UP (ref 12–78)
ANION GAP SERPL CALC-SCNC: 6 MMOL/L — SIGNIFICANT CHANGE UP (ref 5–17)
APTT BLD: 78.8 SEC — HIGH (ref 27.5–35.5)
APTT BLD: 79.7 SEC — HIGH (ref 27.5–35.5)
AST SERPL-CCNC: 18 U/L — SIGNIFICANT CHANGE UP (ref 15–37)
BASOPHILS # BLD AUTO: 0.04 K/UL — SIGNIFICANT CHANGE UP (ref 0–0.2)
BASOPHILS NFR BLD AUTO: 0.8 % — SIGNIFICANT CHANGE UP (ref 0–2)
BILIRUB SERPL-MCNC: 0.6 MG/DL — SIGNIFICANT CHANGE UP (ref 0.2–1.2)
BUN SERPL-MCNC: 29 MG/DL — HIGH (ref 7–23)
CALCIUM SERPL-MCNC: 8.5 MG/DL — SIGNIFICANT CHANGE UP (ref 8.5–10.1)
CHLORIDE SERPL-SCNC: 105 MMOL/L — SIGNIFICANT CHANGE UP (ref 96–108)
CO2 SERPL-SCNC: 27 MMOL/L — SIGNIFICANT CHANGE UP (ref 22–31)
CREAT SERPL-MCNC: 1.3 MG/DL — SIGNIFICANT CHANGE UP (ref 0.5–1.3)
EOSINOPHIL # BLD AUTO: 0.1 K/UL — SIGNIFICANT CHANGE UP (ref 0–0.5)
EOSINOPHIL NFR BLD AUTO: 2.1 % — SIGNIFICANT CHANGE UP (ref 0–6)
GLUCOSE SERPL-MCNC: 240 MG/DL — HIGH (ref 70–99)
HCT VFR BLD CALC: 29.3 % — LOW (ref 39–50)
HGB BLD-MCNC: 10.1 G/DL — LOW (ref 13–17)
IMM GRANULOCYTES NFR BLD AUTO: 1.1 % — SIGNIFICANT CHANGE UP (ref 0–1.5)
INR BLD: 1.51 RATIO — HIGH (ref 0.88–1.16)
LYMPHOCYTES # BLD AUTO: 0.77 K/UL — LOW (ref 1–3.3)
LYMPHOCYTES # BLD AUTO: 16.2 % — SIGNIFICANT CHANGE UP (ref 13–44)
MCHC RBC-ENTMCNC: 31.7 PG — SIGNIFICANT CHANGE UP (ref 27–34)
MCHC RBC-ENTMCNC: 34.5 GM/DL — SIGNIFICANT CHANGE UP (ref 32–36)
MCV RBC AUTO: 91.8 FL — SIGNIFICANT CHANGE UP (ref 80–100)
MONOCYTES # BLD AUTO: 0.43 K/UL — SIGNIFICANT CHANGE UP (ref 0–0.9)
MONOCYTES NFR BLD AUTO: 9 % — SIGNIFICANT CHANGE UP (ref 2–14)
NEUTROPHILS # BLD AUTO: 3.37 K/UL — SIGNIFICANT CHANGE UP (ref 1.8–7.4)
NEUTROPHILS NFR BLD AUTO: 70.8 % — SIGNIFICANT CHANGE UP (ref 43–77)
NRBC # BLD: 0 /100 WBCS — SIGNIFICANT CHANGE UP (ref 0–0)
PLATELET # BLD AUTO: 205 K/UL — SIGNIFICANT CHANGE UP (ref 150–400)
POTASSIUM SERPL-MCNC: 3.6 MMOL/L — SIGNIFICANT CHANGE UP (ref 3.5–5.3)
POTASSIUM SERPL-SCNC: 3.6 MMOL/L — SIGNIFICANT CHANGE UP (ref 3.5–5.3)
PROT SERPL-MCNC: 6.4 G/DL — SIGNIFICANT CHANGE UP (ref 6–8.3)
PROTHROM AB SERPL-ACNC: 17.3 SEC — HIGH (ref 10.6–13.6)
RBC # BLD: 3.19 M/UL — LOW (ref 4.2–5.8)
RBC # FLD: 12.9 % — SIGNIFICANT CHANGE UP (ref 10.3–14.5)
SODIUM SERPL-SCNC: 138 MMOL/L — SIGNIFICANT CHANGE UP (ref 135–145)
WBC # BLD: 4.76 K/UL — SIGNIFICANT CHANGE UP (ref 3.8–10.5)
WBC # FLD AUTO: 4.76 K/UL — SIGNIFICANT CHANGE UP (ref 3.8–10.5)

## 2022-01-30 PROCEDURE — 99232 SBSQ HOSP IP/OBS MODERATE 35: CPT

## 2022-01-30 PROCEDURE — 99233 SBSQ HOSP IP/OBS HIGH 50: CPT

## 2022-01-30 RX ORDER — DIAZEPAM 5 MG
5 TABLET ORAL
Refills: 0 | Status: DISCONTINUED | OUTPATIENT
Start: 2022-01-30 | End: 2022-02-02

## 2022-01-30 RX ORDER — WARFARIN SODIUM 2.5 MG/1
2.5 TABLET ORAL ONCE
Refills: 0 | Status: COMPLETED | OUTPATIENT
Start: 2022-01-30 | End: 2022-01-30

## 2022-01-30 RX ORDER — CEFTRIAXONE 500 MG/1
1000 INJECTION, POWDER, FOR SOLUTION INTRAMUSCULAR; INTRAVENOUS
Refills: 0 | Status: DISCONTINUED | OUTPATIENT
Start: 2022-01-30 | End: 2022-02-02

## 2022-01-30 RX ADMIN — Medication 3 UNIT(S): at 08:11

## 2022-01-30 RX ADMIN — HYDROGEN PEROXIDE 1 APPLICATION(S): 0.3 LIQUID TOPICAL at 17:20

## 2022-01-30 RX ADMIN — MUPIROCIN 1 APPLICATION(S): 20 OINTMENT TOPICAL at 06:10

## 2022-01-30 RX ADMIN — WARFARIN SODIUM 2.5 MILLIGRAM(S): 2.5 TABLET ORAL at 21:19

## 2022-01-30 RX ADMIN — HEPARIN SODIUM 1700 UNIT(S)/HR: 5000 INJECTION INTRAVENOUS; SUBCUTANEOUS at 10:23

## 2022-01-30 RX ADMIN — CEFTRIAXONE 100 MILLIGRAM(S): 500 INJECTION, POWDER, FOR SOLUTION INTRAMUSCULAR; INTRAVENOUS at 17:19

## 2022-01-30 RX ADMIN — HYDROGEN PEROXIDE 1 APPLICATION(S): 0.3 LIQUID TOPICAL at 06:20

## 2022-01-30 RX ADMIN — CEFEPIME 100 MILLIGRAM(S): 1 INJECTION, POWDER, FOR SOLUTION INTRAMUSCULAR; INTRAVENOUS at 06:02

## 2022-01-30 RX ADMIN — Medication 3 UNIT(S): at 11:45

## 2022-01-30 RX ADMIN — HEPARIN SODIUM 1700 UNIT(S)/HR: 5000 INJECTION INTRAVENOUS; SUBCUTANEOUS at 07:30

## 2022-01-30 RX ADMIN — Medication 5 MILLIGRAM(S): at 21:18

## 2022-01-30 RX ADMIN — MUPIROCIN 1 APPLICATION(S): 20 OINTMENT TOPICAL at 17:23

## 2022-01-30 RX ADMIN — Medication 5 MILLIGRAM(S): at 13:13

## 2022-01-30 RX ADMIN — HEPARIN SODIUM 1700 UNIT(S)/HR: 5000 INJECTION INTRAVENOUS; SUBCUTANEOUS at 04:37

## 2022-01-30 RX ADMIN — Medication 1 DROP(S): at 06:02

## 2022-01-30 RX ADMIN — HEPARIN SODIUM 1700 UNIT(S)/HR: 5000 INJECTION INTRAVENOUS; SUBCUTANEOUS at 19:02

## 2022-01-30 RX ADMIN — ISOSORBIDE MONONITRATE 30 MILLIGRAM(S): 60 TABLET, EXTENDED RELEASE ORAL at 11:27

## 2022-01-30 RX ADMIN — Medication 4: at 21:19

## 2022-01-30 RX ADMIN — Medication 3: at 11:45

## 2022-01-30 RX ADMIN — HEPARIN SODIUM 1700 UNIT(S)/HR: 5000 INJECTION INTRAVENOUS; SUBCUTANEOUS at 02:43

## 2022-01-30 RX ADMIN — CITALOPRAM 20 MILLIGRAM(S): 10 TABLET, FILM COATED ORAL at 11:28

## 2022-01-30 RX ADMIN — Medication 3: at 08:10

## 2022-01-30 RX ADMIN — Medication 1 DROP(S): at 17:20

## 2022-01-30 NOTE — PROGRESS NOTE ADULT - ASSESSMENT
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 Patient fell 1 week ago and sustained facial trauma, was seen here, had nasal bone fractures and anterior nasal swelling admitted for nasal abscess.    1/28/22 - INR continues to rise, 6.46, d/w pharmacy will give 5 VitK for reversal -- recheck INR 10pm tonight, may need another 5 mg overnight -- recheck INR in AM, would prefer to get to therapeutic level if going to OR in AM -- possible OR pending clinical course per ENT recs. Clinically improved today per ENT, WBC improved. temp noted.     *called wife Roxanne, no answer.     1/29/22 - INR 1.92 -- will bridge w/ heparin for time being and resume coumadin after OR, if surgery is warranted. Afebrile overnight, WBC resolved, abscess Cx prelim growing strep pyogenes. C/w Vanco. added premeal admelog 3 units AC for tighter glucose control.    *updated wife Roxanne    1/30/22 - glucose elevated, diabetic specialist eval requested. would prefer tighter control given active infection. INR 1.51, will resume coumadin tonight as no plan for OR at the moment given marked improvement clinically overnight of nasal hematoma/infection. Give 2.5mg warfarin tonight, dose per INR tomorrow. ID eval pending for today. c/w vanco/cefepime in interim. 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 Patient fell 1 week ago and sustained facial trauma, was seen here, had nasal bone fractures and anterior nasal swelling admitted for nasal abscess.    1/28/22 - INR continues to rise, 6.46, d/w pharmacy will give 5 VitK for reversal -- recheck INR 10pm tonight, may need another 5 mg overnight -- recheck INR in AM, would prefer to get to therapeutic level if going to OR in AM -- possible OR pending clinical course per ENT recs. Clinically improved today per ENT, WBC improved. temp noted.     *called wife Roxanne, no answer.     1/29/22 - INR 1.92 -- will bridge w/ heparin for time being and resume coumadin after OR, if surgery is warranted. Afebrile overnight, WBC resolved, abscess Cx prelim growing strep pyogenes. C/w Vanco. added premeal admelog 3 units AC for tighter glucose control.    *updated wife Roxanne    1/30/22 - glucose elevated, diabetic specialist eval requested. would prefer tighter control given active infection. INR 1.51, will resume coumadin tonight as no plan for OR at the moment given marked improvement clinically overnight of nasal hematoma/infection. Give 2.5mg warfarin tonight, dose per INR tomorrow. ID eval pending for today. c/w vanco/cefepime in interim. I-STOP consulted, shows pt prev taking valium 10mg daily, will give 5mg BID for anxiety.

## 2022-01-30 NOTE — PROGRESS NOTE ADULT - ASSESSMENT
A/P: 89 yo M with open nasal nasal fractures which progressed to nasoseptal abscess  -Cont IV antibiotics, cx group a strep  -Rec ID eval, still pending  -Likely will not need acute surgical intervention, possible discharge home tomorrow pending clinical course  -d/w hospitalist    Adriano Bowling DO

## 2022-01-30 NOTE — PROGRESS NOTE ADULT - ASSESSMENT
89yM with PMH of HTN, DM2, depression and hx of AVR/MVR, Afib (on warfarin), admitted for nasal abscess requiring IV antibiotics, and nasal fracture. Cardiology consulted for pre operative clearance.     Patient has PPM placed in 2019 for SSS, last interrogated August 2021.  Adelso life: 9.3-11.2 yrs.  Report on chart.  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   Cardiac Cath preformed 2/2 to chronic chest pain, medical therapy recommended at the time     Supratherapeutic INR/BioMVR/AVR/Afib/Cardiac Optimization  - s/p Vit K 5 mg PO x 1 for INR of 5.53 -->6.46.    - Today's INR = 1.51.  Continue Heparin gtt.  Can hold temporarily as deemed necessary prior to nasal abscess I & D.  Resume dosing Coumadin postop once cleared by ENT to keep INR 2-3  - EKG: no signs of plaque rupture; Vpaced.  Tele showing Vpaced rhythm with underlying NSR.  Can D/C monitor   - Monitor electrolytes, replete to keep K>4 and Mag>2  - No evidence of ischemia, significant cardiac murmur, cardiac arrhythmia or volume overload.  - He is considered an intermediate risk for an intermediate risk procedure, but may proceed with planned procedure. He has no modifiable risk factors at this time  - Cardiac records from Dr. Raygoza on chart.  PPM interrogation done 11/2021: Adelso life 9.3-11.2 yrs.  Record on chart    Will continue to follow    Kim Fung DNP, NP-C  Cardiology   Spectra #[phone]     89yM with PMH of HTN, DM2, depression and hx of AVR/MVR, Afib (on warfarin), admitted for nasal abscess requiring IV antibiotics, and nasal fracture. Cardiology consulted for pre operative clearance.     Patient has PPM placed in 2019 for SSS, last interrogated August 2021.  Adelso life: 9.3-11.2 yrs.  Report on chart.  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   Cardiac Cath preformed 2/2 to chronic chest pain, medical therapy recommended at the time     Supratherapeutic INR/BioMVR/AVR/Afib/Cardiac Optimization  - s/p Vit K 5 mg PO x 1 for INR of 5.53 -->6.46.    - Today's INR = 1.51.  Continue Heparin gtt.  Can hold temporarily as deemed necessary prior to nasal abscess I & D.  Resume dosing Coumadin postop once cleared by ENT to keep INR 2-3  - EKG: no signs of plaque rupture; Vpaced.  Tele showing Vpaced rhythm with underlying NSR.  Can D/C monitor   - Monitor electrolytes, replete to keep K>4 and Mag>2  - No evidence of ischemia, significant cardiac murmur, cardiac arrhythmia or volume overload.  - He is considered an intermediate risk for an intermediate risk procedure, but may proceed with planned procedure. He has no modifiable risk factors at this time.  However, per ENT, no procedure needed and for possible D/C in am    - Cardiac records from Dr. Chung on chart.  PPM interrogation done 11/2021: Adelso life 9.3-11.2 yrs.  Record on chart  - Stable from cardiac standpoint.  Will follow up with his private Cardiologist  Will continue to follow while admitted    Kim Fung DNP, NP-C  Cardiology   Spectra #[phone]

## 2022-01-31 LAB
ANION GAP SERPL CALC-SCNC: 6 MMOL/L — SIGNIFICANT CHANGE UP (ref 5–17)
APTT BLD: 107.4 SEC — HIGH (ref 27.5–35.5)
APTT BLD: 77.9 SEC — HIGH (ref 27.5–35.5)
APTT BLD: 84.5 SEC — HIGH (ref 27.5–35.5)
BASOPHILS # BLD AUTO: 0.03 K/UL — SIGNIFICANT CHANGE UP (ref 0–0.2)
BASOPHILS NFR BLD AUTO: 0.5 % — SIGNIFICANT CHANGE UP (ref 0–2)
BUN SERPL-MCNC: 26 MG/DL — HIGH (ref 7–23)
CALCIUM SERPL-MCNC: 8.2 MG/DL — LOW (ref 8.5–10.1)
CHLORIDE SERPL-SCNC: 106 MMOL/L — SIGNIFICANT CHANGE UP (ref 96–108)
CO2 SERPL-SCNC: 27 MMOL/L — SIGNIFICANT CHANGE UP (ref 22–31)
CREAT SERPL-MCNC: 1.3 MG/DL — SIGNIFICANT CHANGE UP (ref 0.5–1.3)
EOSINOPHIL # BLD AUTO: 0.13 K/UL — SIGNIFICANT CHANGE UP (ref 0–0.5)
EOSINOPHIL NFR BLD AUTO: 2.4 % — SIGNIFICANT CHANGE UP (ref 0–6)
GLUCOSE SERPL-MCNC: 180 MG/DL — HIGH (ref 70–99)
HCT VFR BLD CALC: 26.8 % — LOW (ref 39–50)
HGB BLD-MCNC: 9.2 G/DL — LOW (ref 13–17)
IMM GRANULOCYTES NFR BLD AUTO: 1.1 % — SIGNIFICANT CHANGE UP (ref 0–1.5)
INR BLD: 1.57 RATIO — HIGH (ref 0.88–1.16)
LYMPHOCYTES # BLD AUTO: 1.02 K/UL — SIGNIFICANT CHANGE UP (ref 1–3.3)
LYMPHOCYTES # BLD AUTO: 18.4 % — SIGNIFICANT CHANGE UP (ref 13–44)
MCHC RBC-ENTMCNC: 31.5 PG — SIGNIFICANT CHANGE UP (ref 27–34)
MCHC RBC-ENTMCNC: 34.3 GM/DL — SIGNIFICANT CHANGE UP (ref 32–36)
MCV RBC AUTO: 91.8 FL — SIGNIFICANT CHANGE UP (ref 80–100)
MONOCYTES # BLD AUTO: 0.49 K/UL — SIGNIFICANT CHANGE UP (ref 0–0.9)
MONOCYTES NFR BLD AUTO: 8.9 % — SIGNIFICANT CHANGE UP (ref 2–14)
NEUTROPHILS # BLD AUTO: 3.8 K/UL — SIGNIFICANT CHANGE UP (ref 1.8–7.4)
NEUTROPHILS NFR BLD AUTO: 68.7 % — SIGNIFICANT CHANGE UP (ref 43–77)
NRBC # BLD: 0 /100 WBCS — SIGNIFICANT CHANGE UP (ref 0–0)
PLATELET # BLD AUTO: 197 K/UL — SIGNIFICANT CHANGE UP (ref 150–400)
POTASSIUM SERPL-MCNC: 3.6 MMOL/L — SIGNIFICANT CHANGE UP (ref 3.5–5.3)
POTASSIUM SERPL-SCNC: 3.6 MMOL/L — SIGNIFICANT CHANGE UP (ref 3.5–5.3)
PROTHROM AB SERPL-ACNC: 18 SEC — HIGH (ref 10.6–13.6)
RBC # BLD: 2.92 M/UL — LOW (ref 4.2–5.8)
RBC # FLD: 13.2 % — SIGNIFICANT CHANGE UP (ref 10.3–14.5)
SODIUM SERPL-SCNC: 139 MMOL/L — SIGNIFICANT CHANGE UP (ref 135–145)
WBC # BLD: 5.53 K/UL — SIGNIFICANT CHANGE UP (ref 3.8–10.5)
WBC # FLD AUTO: 5.53 K/UL — SIGNIFICANT CHANGE UP (ref 3.8–10.5)

## 2022-01-31 PROCEDURE — 99232 SBSQ HOSP IP/OBS MODERATE 35: CPT

## 2022-01-31 RX ORDER — WARFARIN SODIUM 2.5 MG/1
3 TABLET ORAL ONCE
Refills: 0 | Status: COMPLETED | OUTPATIENT
Start: 2022-01-31 | End: 2022-01-31

## 2022-01-31 RX ADMIN — Medication 5 MILLIGRAM(S): at 23:46

## 2022-01-31 RX ADMIN — ISOSORBIDE MONONITRATE 30 MILLIGRAM(S): 60 TABLET, EXTENDED RELEASE ORAL at 11:18

## 2022-01-31 RX ADMIN — CITALOPRAM 20 MILLIGRAM(S): 10 TABLET, FILM COATED ORAL at 11:18

## 2022-01-31 RX ADMIN — MUPIROCIN 1 APPLICATION(S): 20 OINTMENT TOPICAL at 17:55

## 2022-01-31 RX ADMIN — CEFTRIAXONE 100 MILLIGRAM(S): 500 INJECTION, POWDER, FOR SOLUTION INTRAMUSCULAR; INTRAVENOUS at 17:48

## 2022-01-31 RX ADMIN — Medication 1 DROP(S): at 05:14

## 2022-01-31 RX ADMIN — Medication 3 UNIT(S): at 07:49

## 2022-01-31 RX ADMIN — Medication 2: at 11:50

## 2022-01-31 RX ADMIN — Medication 3: at 21:20

## 2022-01-31 RX ADMIN — HEPARIN SODIUM 1700 UNIT(S)/HR: 5000 INJECTION INTRAVENOUS; SUBCUTANEOUS at 07:06

## 2022-01-31 RX ADMIN — WARFARIN SODIUM 3 MILLIGRAM(S): 2.5 TABLET ORAL at 21:21

## 2022-01-31 RX ADMIN — HYDROGEN PEROXIDE 1 APPLICATION(S): 0.3 LIQUID TOPICAL at 05:14

## 2022-01-31 RX ADMIN — HYDROGEN PEROXIDE 1 APPLICATION(S): 0.3 LIQUID TOPICAL at 17:48

## 2022-01-31 RX ADMIN — HEPARIN SODIUM 1500 UNIT(S)/HR: 5000 INJECTION INTRAVENOUS; SUBCUTANEOUS at 23:44

## 2022-01-31 RX ADMIN — Medication 3 UNIT(S): at 16:52

## 2022-01-31 RX ADMIN — Medication 3 UNIT(S): at 11:50

## 2022-01-31 RX ADMIN — HEPARIN SODIUM 1500 UNIT(S)/HR: 5000 INJECTION INTRAVENOUS; SUBCUTANEOUS at 10:59

## 2022-01-31 RX ADMIN — HEPARIN SODIUM 1500 UNIT(S)/HR: 5000 INJECTION INTRAVENOUS; SUBCUTANEOUS at 07:45

## 2022-01-31 RX ADMIN — Medication 5 MILLIGRAM(S): at 01:41

## 2022-01-31 RX ADMIN — MUPIROCIN 1 APPLICATION(S): 20 OINTMENT TOPICAL at 05:15

## 2022-01-31 RX ADMIN — Medication 1 DROP(S): at 17:55

## 2022-01-31 RX ADMIN — Medication 2: at 16:52

## 2022-01-31 RX ADMIN — HEPARIN SODIUM 1500 UNIT(S)/HR: 5000 INJECTION INTRAVENOUS; SUBCUTANEOUS at 15:24

## 2022-01-31 RX ADMIN — Medication 2: at 07:48

## 2022-01-31 RX ADMIN — HEPARIN SODIUM 1500 UNIT(S)/HR: 5000 INJECTION INTRAVENOUS; SUBCUTANEOUS at 19:03

## 2022-01-31 NOTE — PHARMACOTHERAPY INTERVENTION NOTE - COMMENTS
Patient is a 89y Male with a documented penicillin allergy ordered for Rocephin for Cellulitis. Discussed allergy details with patient [see questionnaire below]. Patient had a non-severe rash-type reaction 70 years ago and there is currently low suspicion for a true penicillin allergy.       Penicillin Allergy Questionnaire  Background Information  1. How old was the patient when the reaction occurred?   - Patient was unable to recall exact age, but states it was no earlier than 70 years ago.  2. Which penicillin antibiotic caused the reaction?  - Patient unable to recall    Allergy Description  3. How long after beginning penicillin did the reaction occur? (*if answer is < 1 hour, it is a sign of an immediate reaction)  - Patient unable to recall exactly, but does not believe reaction was immediate.  4. What was the reaction?  - Patient unable to recall exactly, but believes it may have been some sort of rash.   5. Was there any wheezing, flushing, itching, hypotension, urticaria, or lips, throat or mouth swelling? (*if answer is yes to any of the above, it is a sign of an immediate reaction)  - Patient does not recall any sort of severe reaction.   6. If a rash occurred, what was the nature of the rash? Where was it and what did it look like? Did the rash include any blistering or exfoliating features?  - Patient unable to recall exactly, but states reaction was not severe    Previous Tolerance  7. Was the patient on other medications at the time of the reaction?  - Patient unable to recall  8. Since then, has the patient ever received another penicillin or cephalosporin? (for example, Augmentin, Keflex, Omnicef, or Rocephin)  - Patient unable to recall exactly, but does not believe they have taken pcn antibiotics in the past. Patient currently tolerating Rocephin and has tolerated Cefepime in the past.   9. If the patient received a beta-lactams subsequent to the initial reaction, what happened?  - Tolerated therapy Patient is a 89y Male with a documented penicillin allergy ordered for Rocephin for Cellulitis. Discussed allergy details with patient [see questionnaire below]. Patient is suspected to have had a non-immediate rash that was non-urticarial and not consistent with SJS approximately 70 years ago. Exact details regarding the nature of the reaction are uncertain, but there is currently low suspicion for a true penicillin allergy.     Penicillin Allergy Questionnaire  Background Information  1. How old was the patient when the reaction occurred?   - Patient was unable to recall exact age, but states it was no earlier than 70 years ago.  2. Which penicillin antibiotic caused the reaction?  - Patient unable to recall    Allergy Description  3. How long after beginning penicillin did the reaction occur? (*if answer is < 1 hour, it is a sign of an immediate reaction)  - Patient unable to recall exactly, but does not believe reaction was immediate.  4. What was the reaction?  - Patient unable to recall exactly, but believes it may have been some sort of rash.   5. Was there any wheezing, flushing, itching, hypotension, urticaria, or lips, throat or mouth swelling? (*if answer is yes to any of the above, it is a sign of an immediate reaction)  - Patient does not recall any sort of severe reaction.   6. If a rash occurred, what was the nature of the rash? Where was it and what did it look like? Did the rash include any blistering or exfoliating features?  - Patient unable to recall exactly, but states reaction was not severe    Previous Tolerance  7. Was the patient on other medications at the time of the reaction?  - Patient unable to recall  8. Since then, has the patient ever received another penicillin or cephalosporin? (for example, Augmentin, Keflex, Omnicef, or Rocephin)  - Patient unable to recall exactly, but does not believe they have taken pcn antibiotics in the past. Patient currently tolerating Rocephin and has tolerated Cefepime in the past.   9. If the patient received a beta-lactams subsequent to the initial reaction, what happened?  - Tolerated therapy

## 2022-01-31 NOTE — CONSULT NOTE ADULT - PROBLEM SELECTOR RECOMMENDATION 9
cont admelog 3 units 3x/day before meals  cont low dose admelog corrective scale coverage qac/qhs  cont cons cho diet  goal bg 100-180 in hosp setting

## 2022-01-31 NOTE — CONSULT NOTE ADULT - REASON FOR ADMISSION
Possible Nasal abscess

## 2022-01-31 NOTE — PROGRESS NOTE ADULT - ASSESSMENT
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 Patient fell 1 week ago and sustained facial trauma, was seen here, had nasal bone fractures and anterior nasal swelling admitted for nasal abscess.    1/28/22 - INR continues to rise, 6.46, d/w pharmacy will give 5 VitK for reversal -- recheck INR 10pm tonight, may need another 5 mg overnight -- recheck INR in AM, would prefer to get to therapeutic level if going to OR in AM -- possible OR pending clinical course per ENT recs. Clinically improved today per ENT, WBC improved. temp noted.     *called wife Roxanne, no answer.     1/29/22 - INR 1.92 -- will bridge w/ heparin for time being and resume coumadin after OR, if surgery is warranted. Afebrile overnight, WBC resolved, abscess Cx prelim growing strep pyogenes. C/w Vanco. added premeal admelog 3 units AC for tighter glucose control.    *updated wife Roxanne    1/30/22 - glucose elevated, diabetic specialist eval requested. would prefer tighter control given active infection. INR 1.51, will resume coumadin tonight as no plan for OR at the moment given marked improvement clinically overnight of nasal hematoma/infection. Give 2.5mg warfarin tonight, dose per INR tomorrow. ID eval pending for today. c/w vanco/cefepime in interim. I-STOP consulted, shows pt prev taking valium 10mg daily, will give 5mg BID for anxiety.    1/31/22 - endo eval noted, INR 1.57 -- give 3mg warfarin tonight, presented supratherapeutic on admission, would advise close monitoring prior to DC. Dose per INR tomorrow. start PO vantin x 7 days on discharge.

## 2022-01-31 NOTE — CONSULT NOTE ADULT - CONSULT REASON
Nose abcesses
89y A1C with Estimated Average Glucose Result: 8.0 % (01-28-22 @ 09:01)   diabetes mellitus uncontrolled type 2
Nasal abscess
dm2 uncontrolled
pre operative clearance

## 2022-01-31 NOTE — CONSULT NOTE ADULT - SUBJECTIVE AND OBJECTIVE BOX
Patient is a 89y old  Male who presents with a chief complaint of Possible Nasal abscess (31 Jan 2022 11:50)    Type:2 DX 20 years. Endocrine-no, PCP teri. Rx home: metformin. Glucometer checks- does not have. diabetes education provided- ADA guidelines a1c 8% acceptable for the patient's age. Glucose monitoring not necessary at this time.       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 Patient fell 1 week ago and sustained facial trauma, was seen here, had nasal bone fractures and anterior nasal swelling. Pt was discharged and told to follow up with ENT in 2 days, however he developed chest pain and had a work up at [place], which was unremarkable and was unable to follow up with ENT until today. Today pt saw Dr. Bowling (ENT) in the office and was sent to ED for possible nasal abscess. Wife states that pt's nose has doubled in size with blistering on the outside skin and drainage.     Patient endorses significant worsening in nasal pain, swelling, and purulence over the past couple days. Endorses 2 day hx of worsening headache.Denies acute changes in vision. Endorses nasal congestion and generalized weakness. Patient endorses urinating well without dysuria, changes in urinary frequency or hematuria/pyuria. Patient dnies BM this AM but endorses last BM was regular without hematochezia/melena. Patient denies  fever, chills, tinnitus, cough, SOB, CP, palpitations, abdominal pain, n/v/c/d, confusion, dizziness, paresthesias, weakness, acute changes in vision or hearing, nasal purulence coming out of nares or focal neuro deficits.    ED course:  Vitals: T 97.5, HR 78, /75, RR 16, SpO2 98% on RA  Significant labs: WBC 16.29, H/H: 11.1/32.2, Na 131, Cr 1.5, BUN 32, Glucose 313  CXR: No acute findings. Stable exam  Patient received: 2g cefepime, 1g vancomycin, 1L NS bolus    (27 Jan 2022 17:29)      PAST MEDICAL & SURGICAL HISTORY:  HTN (hypertension)    Type 2 diabetes mellitus    Major depression    Insomnia    No significant past surgical history        REVIEW OF SYSTEMS  General:	as above  Respiratory: NAD, No SOB, no cough  Cardiovascular: No chest pain, no palpitations	  Endocrine: no polyuria, no polydipsia, or S/S of hypoglycemia    Allergies    penicillin (Unknown)    Intolerances        MEDICATIONS  (STANDING):  amLODIPine   Tablet 5 milliGRAM(s) Oral daily  cefTRIAXone   IVPB 1000 milliGRAM(s) IV Intermittent <User Schedule>  citalopram 20 milliGRAM(s) Oral daily  dextrose 40% Gel 15 Gram(s) Oral once  dextrose 5%. 1000 milliLiter(s) (50 mL/Hr) IV Continuous <Continuous>  dextrose 5%. 1000 milliLiter(s) (100 mL/Hr) IV Continuous <Continuous>  dextrose 50% Injectable 25 Gram(s) IV Push once  dextrose 50% Injectable 12.5 Gram(s) IV Push once  dextrose 50% Injectable 25 Gram(s) IV Push once  glucagon  Injectable 1 milliGRAM(s) IntraMuscular once  heparin  Infusion.  Unit(s)/Hr (15 mL/Hr) IV Continuous <Continuous>  hydrogen peroxide 3% Solution 1 Application(s) Topical two times a day  insulin lispro (ADMELOG) corrective regimen sliding scale   SubCutaneous Before meals and at bedtime  insulin lispro Injectable (ADMELOG) 3 Unit(s) SubCutaneous three times a day before meals  isosorbide   mononitrate ER Tablet (IMDUR) 30 milliGRAM(s) Oral daily  mupirocin 2% Nasal 1 Application(s) Nasal two times a day  ofloxacin 0.3% Solution 1 Drop(s) Both EYES two times a day  sodium chloride 0.9%. 1000 milliLiter(s) (50 mL/Hr) IV Continuous <Continuous>  warfarin 3 milliGRAM(s) Oral once

## 2022-01-31 NOTE — CONSULT NOTE ADULT - SUBJECTIVE AND OBJECTIVE BOX
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 Patient fell 1 week ago and sustained facial trauma, was seen here, had nasal bone fractures and anterior nasal swelling. Pt was discharged and told to follow up with ENT in 2 days, however he developed chest pain and had a work up at [place], which was unremarkable and was unable to follow up with ENT until today. Today pt saw Dr. Bowling (ENT) in the office and was sent to ED for possible nasal abscess. Wife states that pt's nose has doubled in size with blistering on the outside skin and drainage.     Patient endorses significant worsening in nasal pain, swelling, and purulence over the past couple days. Endorses 2 day hx of worsening headache.Denies acute changes in vision. Endorses nasal congestion and generalized weakness. Patient endorses urinating well without dysuria, changes in urinary frequency or hematuria/pyuria. Patient dnies BM this AM but endorses last BM was regular without hematochezia/melena. Patient denies  fever, chills, tinnitus, cough, SOB, CP, palpitations, abdominal pain, n/v/c/d, confusion, dizziness, paresthesias, weakness, acute changes in vision or hearing, nasal purulence coming out of nares or focal neuro deficits.    ED course:  Vitals: T 97.5, HR 78, /75, RR 16, SpO2 98% on RA  Significant labs: WBC 16.29, H/H: 11.1/32.2, Na 131, Cr 1.5, BUN 32, Glucose 313  CXR: No acute findings. Stable exam  Patient received: 2g cefepime, 1g vancomycin, 1L NS bolus    (27 Jan 2022 17:29)      PAST MEDICAL & SURGICAL HISTORY:  HTN (hypertension)    Type 2 diabetes mellitus    Major depression    Insomnia    No significant past surgical history        FAMILY HISTORY:  No pertinent family history in first degree relatives          Social History:    MEDICATIONS  (STANDING):  amLODIPine   Tablet 5 milliGRAM(s) Oral daily  cefTRIAXone   IVPB 1000 milliGRAM(s) IV Intermittent <User Schedule>  citalopram 20 milliGRAM(s) Oral daily  dextrose 40% Gel 15 Gram(s) Oral once  dextrose 5%. 1000 milliLiter(s) (50 mL/Hr) IV Continuous <Continuous>  dextrose 5%. 1000 milliLiter(s) (100 mL/Hr) IV Continuous <Continuous>  dextrose 50% Injectable 25 Gram(s) IV Push once  dextrose 50% Injectable 12.5 Gram(s) IV Push once  dextrose 50% Injectable 25 Gram(s) IV Push once  glucagon  Injectable 1 milliGRAM(s) IntraMuscular once  heparin  Infusion.  Unit(s)/Hr (15 mL/Hr) IV Continuous <Continuous>  hydrogen peroxide 3% Solution 1 Application(s) Topical two times a day  insulin lispro (ADMELOG) corrective regimen sliding scale   SubCutaneous Before meals and at bedtime  insulin lispro Injectable (ADMELOG) 3 Unit(s) SubCutaneous three times a day before meals  isosorbide   mononitrate ER Tablet (IMDUR) 30 milliGRAM(s) Oral daily  mupirocin 2% Nasal 1 Application(s) Nasal two times a day  ofloxacin 0.3% Solution 1 Drop(s) Both EYES two times a day  sodium chloride 0.9%. 1000 milliLiter(s) (50 mL/Hr) IV Continuous <Continuous>    MEDICATIONS  (PRN):  acetaminophen     Tablet .. 650 milliGRAM(s) Oral every 6 hours PRN Temp greater or equal to 38C (100.4F), Mild Pain (1 - 3)  diazepam    Tablet 5 milliGRAM(s) Oral two times a day PRN anxiety  heparin   Injectable 6500 Unit(s) IV Push every 6 hours PRN For aPTT less than 40  heparin   Injectable 3000 Unit(s) IV Push every 6 hours PRN For aPTT between 40 - 57  melatonin 5 milliGRAM(s) Oral at bedtime PRN Insomnia        T(C): 36.7 (01-31-22 @ 04:57), Max: 36.7 (01-30-22 @ 21:35)  HR: 54 (01-31-22 @ 04:57) (54 - 74)  BP: 153/71 (01-31-22 @ 04:57) (136/63 - 153/71)  RR: 18 (01-31-22 @ 04:57) (18 - 18)  SpO2: 93% (01-31-22 @ 04:57) (93% - 94%)  Wt(kg): --    PHYSICAL EXAM:  NECK: Supple, No JVD, Normal thyroid  CHEST/LUNG: Clear to percussion bilaterally; No rales, rhonchi, wheezing, or rubs  HEART: Regular rate and rhythm; No murmurs, rubs, or gallops  ABDOMEN: Soft, Nontender, Nondistended; Bowel sounds present  EXTREMITIES:  2+ Peripheral Pulses, No clubbing, cyanosis, or edema      CAPILLARY BLOOD GLUCOSE      POCT Blood Glucose.: 304 mg/dL (30 Jan 2022 21:15)  POCT Blood Glucose.: 136 mg/dL (30 Jan 2022 16:22)  POCT Blood Glucose.: 255 mg/dL (30 Jan 2022 11:33)  POCT Blood Glucose.: 269 mg/dL (30 Jan 2022 08:04)                            9.2    5.53  )-----------( 197      ( 31 Jan 2022 07:13 )             26.8       CMP:  01-30 @ 08:08  SGPT 24  Albumin 2.5   Alk Phos 71   Anion Gap 6   SGOT 18   Total Bili 0.6   BUN 29   Calcium Total 8.5   CO2 27   Chloride 105   Creatinine 1.30   eGFR if AA 56   eGFR if non AA 48   Glucose 240   Potassium 3.6   Protein 6.4   Sodium 138      Thyroid Function Tests:      Diabetes Tests:       Radiology:

## 2022-01-31 NOTE — PROGRESS NOTE ADULT - ASSESSMENT
A/P: 89 yo M who sustained open nasal bone fractures which progressed to a nasoseptal abscess  -Complete resolution on IV abx, group A strep  -Rec ID consult regarding antibiotic choice, duration of therapy while considering drug interaction i.e. Coumadin  -Family requesting endocrinology eval for DM management, they also request accucheck education prior to discharge  -f/u with me in 1 week    Adriano Bowling DO  [facility]  [address], [place], [address] [phone]

## 2022-01-31 NOTE — PROGRESS NOTE ADULT - ASSESSMENT
89yM with PMH of HTN, DM2, depression and hx of AVR/MVR, Afib (on warfarin), admitted for nasal abscess requiring IV antibiotics, and nasal fracture. Cardiology consulted for pre operative clearance.     Patient has PPM placed in 2019 for SSS, last interrogated August 2021.  Adelso life: 9.3-11.2 yrs.  Report on chart.  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   Cardiac Cath preformed 2/2 to chronic chest pain, medical therapy recommended at the time     Supratherapeutic INR/BioMVR/AVR/Afib/Cardiac Optimization  - s/p Vit K 5 mg PO x 1 for INR of 5.53 -->6.46.    - ENT following no surgical intervention necessary at this time, cont abx per ID  - Today's INR = 1.57.  Continue Heparin gtt daily with bridge,  dosing of Coumadin  INR 2-3  - Monitor H/H   - EKG: no signs of plaque rupture; Vpaced.  Tele showing Vpaced rhythm with underlying NSR nw tele d/c'd  - Monitor electrolytes, replete to keep K>4 and Mag>2  - No evidence of ischemia, significant cardiac murmur, cardiac arrhythmia or volume overload.  - Cardiac records from Dr. Chung on chart.  PPM interrogation done 11/2021: Adelso life 9.3-11.2 yrs.  Record on chart  - Stable from cardiac standpoint.  Will follow up with his private Cardiologist    Will continue to follow   Isabelle GÓMEZ [facility]  Spectra # [phone]

## 2022-02-01 LAB
ANION GAP SERPL CALC-SCNC: 5 MMOL/L — SIGNIFICANT CHANGE UP (ref 5–17)
APTT BLD: 88 SEC — HIGH (ref 27.5–35.5)
BUN SERPL-MCNC: 25 MG/DL — HIGH (ref 7–23)
CALCIUM SERPL-MCNC: 8.3 MG/DL — LOW (ref 8.5–10.1)
CHLORIDE SERPL-SCNC: 106 MMOL/L — SIGNIFICANT CHANGE UP (ref 96–108)
CO2 SERPL-SCNC: 27 MMOL/L — SIGNIFICANT CHANGE UP (ref 22–31)
CREAT SERPL-MCNC: 1.3 MG/DL — SIGNIFICANT CHANGE UP (ref 0.5–1.3)
CULTURE RESULTS: SIGNIFICANT CHANGE UP
CULTURE RESULTS: SIGNIFICANT CHANGE UP
GLUCOSE SERPL-MCNC: 199 MG/DL — HIGH (ref 70–99)
INR BLD: 1.71 RATIO — HIGH (ref 0.88–1.16)
POTASSIUM SERPL-MCNC: 3.9 MMOL/L — SIGNIFICANT CHANGE UP (ref 3.5–5.3)
POTASSIUM SERPL-SCNC: 3.9 MMOL/L — SIGNIFICANT CHANGE UP (ref 3.5–5.3)
PROTHROM AB SERPL-ACNC: 19.5 SEC — HIGH (ref 10.6–13.6)
SODIUM SERPL-SCNC: 138 MMOL/L — SIGNIFICANT CHANGE UP (ref 135–145)
SPECIMEN SOURCE: SIGNIFICANT CHANGE UP
SPECIMEN SOURCE: SIGNIFICANT CHANGE UP

## 2022-02-01 PROCEDURE — 99232 SBSQ HOSP IP/OBS MODERATE 35: CPT

## 2022-02-01 PROCEDURE — 99233 SBSQ HOSP IP/OBS HIGH 50: CPT

## 2022-02-01 RX ORDER — INSULIN LISPRO 100/ML
VIAL (ML) SUBCUTANEOUS AT BEDTIME
Refills: 0 | Status: DISCONTINUED | OUTPATIENT
Start: 2022-02-01 | End: 2022-02-02

## 2022-02-01 RX ORDER — INSULIN LISPRO 100/ML
VIAL (ML) SUBCUTANEOUS
Refills: 0 | Status: DISCONTINUED | OUTPATIENT
Start: 2022-02-01 | End: 2022-02-02

## 2022-02-01 RX ORDER — WARFARIN SODIUM 2.5 MG/1
2.5 TABLET ORAL ONCE
Refills: 0 | Status: COMPLETED | OUTPATIENT
Start: 2022-02-01 | End: 2022-02-01

## 2022-02-01 RX ORDER — INSULIN GLARGINE 100 [IU]/ML
7 INJECTION, SOLUTION SUBCUTANEOUS EVERY MORNING
Refills: 0 | Status: DISCONTINUED | OUTPATIENT
Start: 2022-02-01 | End: 2022-02-02

## 2022-02-01 RX ADMIN — Medication 3 UNIT(S): at 08:03

## 2022-02-01 RX ADMIN — INSULIN GLARGINE 7 UNIT(S): 100 INJECTION, SOLUTION SUBCUTANEOUS at 13:21

## 2022-02-01 RX ADMIN — AMLODIPINE BESYLATE 5 MILLIGRAM(S): 2.5 TABLET ORAL at 05:30

## 2022-02-01 RX ADMIN — WARFARIN SODIUM 2.5 MILLIGRAM(S): 2.5 TABLET ORAL at 21:22

## 2022-02-01 RX ADMIN — Medication 2: at 12:05

## 2022-02-01 RX ADMIN — Medication 5 MILLIGRAM(S): at 21:22

## 2022-02-01 RX ADMIN — HEPARIN SODIUM 1500 UNIT(S)/HR: 5000 INJECTION INTRAVENOUS; SUBCUTANEOUS at 19:10

## 2022-02-01 RX ADMIN — HYDROGEN PEROXIDE 1 APPLICATION(S): 0.3 LIQUID TOPICAL at 16:36

## 2022-02-01 RX ADMIN — CEFTRIAXONE 100 MILLIGRAM(S): 500 INJECTION, POWDER, FOR SOLUTION INTRAMUSCULAR; INTRAVENOUS at 18:54

## 2022-02-01 RX ADMIN — Medication 5 MILLIGRAM(S): at 13:21

## 2022-02-01 RX ADMIN — HEPARIN SODIUM 1500 UNIT(S)/HR: 5000 INJECTION INTRAVENOUS; SUBCUTANEOUS at 20:18

## 2022-02-01 RX ADMIN — MUPIROCIN 1 APPLICATION(S): 20 OINTMENT TOPICAL at 21:22

## 2022-02-01 RX ADMIN — HEPARIN SODIUM 1500 UNIT(S)/HR: 5000 INJECTION INTRAVENOUS; SUBCUTANEOUS at 08:53

## 2022-02-01 RX ADMIN — Medication 1 DROP(S): at 05:29

## 2022-02-01 RX ADMIN — CITALOPRAM 20 MILLIGRAM(S): 10 TABLET, FILM COATED ORAL at 12:04

## 2022-02-01 RX ADMIN — Medication 5 MILLIGRAM(S): at 02:24

## 2022-02-01 RX ADMIN — HYDROGEN PEROXIDE 1 APPLICATION(S): 0.3 LIQUID TOPICAL at 05:31

## 2022-02-01 RX ADMIN — Medication 3 UNIT(S): at 12:05

## 2022-02-01 RX ADMIN — Medication 4: at 16:38

## 2022-02-01 RX ADMIN — HEPARIN SODIUM 1500 UNIT(S)/HR: 5000 INJECTION INTRAVENOUS; SUBCUTANEOUS at 07:28

## 2022-02-01 RX ADMIN — HEPARIN SODIUM 1500 UNIT(S)/HR: 5000 INJECTION INTRAVENOUS; SUBCUTANEOUS at 02:19

## 2022-02-01 RX ADMIN — Medication 1 DROP(S): at 16:36

## 2022-02-01 RX ADMIN — Medication 2: at 08:03

## 2022-02-01 RX ADMIN — ISOSORBIDE MONONITRATE 30 MILLIGRAM(S): 60 TABLET, EXTENDED RELEASE ORAL at 12:04

## 2022-02-01 RX ADMIN — MUPIROCIN 1 APPLICATION(S): 20 OINTMENT TOPICAL at 05:33

## 2022-02-01 NOTE — PROGRESS NOTE ADULT - PROBLEM SELECTOR PROBLEM 6
History of aortic valve replacement

## 2022-02-01 NOTE — PHYSICAL THERAPY INITIAL EVALUATION ADULT - PERTINENT HX OF CURRENT PROBLEM, REHAB EVAL
Pt fell 1 week ago and sustained facial trauma, was seen here, had nasal bone fractures and anterior nasal swelling, pt was d/c to follow up with ENT in 2 days, but then pt developed chest pain and had a work up at [place] which was unremarkable but pt was not able to follow up with ENT. Pt seen Dr. Bowling (ENT) in the office and was sent to ED for possible nasal abscess.  wife states that pt's nose has doubled in size with blistering on the outside skin and drainage.

## 2022-02-01 NOTE — PROGRESS NOTE ADULT - PROBLEM SELECTOR PROBLEM 9
Anxiety and depression

## 2022-02-01 NOTE — PROGRESS NOTE ADULT - PROBLEM SELECTOR PLAN 8
Patient is on zolpidem at home, hold for now  Will give melatonin inpatient for now

## 2022-02-01 NOTE — PROGRESS NOTE ADULT - PROBLEM SELECTOR PLAN 2
Supratherapeutic INR of 5.53  - Hold warfarin for now   - F/u AM INR
hx of MVR, AVR, with supratherapeutic INR  Supratherapeutic INR on admission, warfarin was held initially but resumed 1/30  - INR 1.7 today, will dose warfarin tonight.
Supratherapeutic INR of 5.53  - Hold warfarin for now   - F/u AM INR

## 2022-02-01 NOTE — PROGRESS NOTE ADULT - ASSESSMENT
89yM with PMH of HTN, DM2, depression and hx of AVR/MVR, Afib (on warfarin), admitted for nasal abscess requiring IV antibiotics, and nasal fracture. Cardiology consulted for pre operative clearance.     Patient has PPM placed in 2019 for SSS, last interrogated August 2021.  Adelso life: 9.3-11.2 yrs.  Report on chart.  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   Cardiac Cath preformed 2/2 to chronic chest pain, medical therapy recommended at the time     Supratherapeutic INR/BioMVR/AVR/Afib/Cardiac Optimization  - s/p Vit K 5 mg PO x 1 on admission for INR of 5.53 -->6.46.    - ENT following no surgical intervention necessary at this time, cont abx per ID  - Today's INR = 1.71.  Continue Heparin gtt daily with bridge,  dosing of Coumadin  INR 2.5-3.5   - Monitor H/H, stable  - EKG: no signs of plaque rupture; Vpaced.  Tele showing Vpaced rhythm with underlying NSR nw tele d/c'd  - Monitor electrolytes, replete to keep K>4 and Mag>2  - No evidence of ischemia, significant cardiac murmur, cardiac arrhythmia or volume overload.  - Cardiac records from Dr. Chung on chart.  PPM interrogation done 11/2021: Adelso life 9.3-11.2 yrs.  Record on chart  - Stable from cardiac standpoint.  Will follow up with his private Cardiologist    Will continue to follow   Isabelle GÓMEZ [facility]   89yM with PMH of HTN, DM2, depression and hx of AVR/MVR, Afib (on warfarin), admitted for nasal abscess requiring IV antibiotics, and nasal fracture. Cardiology consulted for pre operative clearance.     Patient has PPM placed in 2019 for SSS, last interrogated August 2021.  Adelso life: 9.3-11.2 yrs.  Report on chart.  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   Cardiac Cath preformed 2/2 to chronic chest pain, medical therapy recommended at the time     Supratherapeutic INR/BioMVR/AVR/Afib/Cardiac Optimization  - s/p Vit K 5 mg PO x 1 on admission for INR of 5.53 -->6.46.    - ENT following no surgical intervention necessary at this time, cont abx per ID  - Today's INR = 1.71.  Continue Heparin gtt daily with bridge,  dosing of Coumadin  INR 2.0-3   - Monitor H/H, stable  - EKG: no signs of plaque rupture; Vpaced.  Tele showing Vpaced rhythm with underlying NSR nw tele d/c'd  - Monitor electrolytes, replete to keep K>4 and Mag>2  - No evidence of ischemia, significant cardiac murmur, cardiac arrhythmia or volume overload.  - Cardiac records from Dr. Chung on chart.  PPM interrogation done 11/2021: Adelso life 9.3-11.2 yrs.  Record on chart  - Stable from cardiac standpoint.  Will follow up with his private Cardiologist    Will continue to follow   Isabelle BEARDCNP

## 2022-02-01 NOTE — PROGRESS NOTE ADULT - PROBLEM SELECTOR PLAN 3
Na 131 on admission without any encephalopathy  - Possibly due to HCTZ, hold while inpatient  - Monitor electrolytes with daily BMP
Na 131 on admission - now 138, resolved  - Possibly due to HCTZ, hold while inpatient  - Monitor electrolytes with daily BMP

## 2022-02-01 NOTE — PHARMACOTHERAPY INTERVENTION NOTE - COMMENTS
Patient is 89y M presenting s/p fall with nasal abscess. Patient is currently being treated with ceftriaxone 1g IV daily. Discussed with Dr. Rodriguez potential change to PO cefpodoxime 200 mg BID given resolution of leukocytosis, patient able to tolerate PO medications, and patient afebrile. However, given concern for nasal abscess MD prefers to administer antibiotics intravenously while patient the patient is admitted. Patient is 89y M presenting s/p fall with nasal abscess. Patient is currently being treated with ceftriaxone 1g IV daily. Discussed with Dr. Rodriguez potential change to PO cefpodoxime 200 mg BID given resolution of leukocytosis, patient able to tolerate PO medications, and patient afebrile. However, given concern for nasal abscess MD prefers to administer antibiotics intravenously while the patient is admitted.

## 2022-02-01 NOTE — PROGRESS NOTE ADULT - PROBLEM SELECTOR PROBLEM 4
ROBBIE (acute kidney injury)

## 2022-02-01 NOTE — PROGRESS NOTE ADULT - TIME BILLING
Chart review, examination, documentation, care coordination and counseling.   Updated wife  f/u PT recs  Adjust insulin regimen  Wound care RN c/s

## 2022-02-01 NOTE — PROGRESS NOTE ADULT - PROBLEM SELECTOR PLAN 7
Chronic stable  - c/w home amlodipine and imdur with hold parameters  - hold home losartan-HCTZ in setting of ROBBIE and hyponatremia

## 2022-02-01 NOTE — PROGRESS NOTE ADULT - PROBLEM SELECTOR PLAN 5
Hold oral medication of metformin   Start low dose coverage scale   fingerstick glucose checks qac and qhs when diet present   fingerstick glucose checks q6h while NPO  HbA1c in AM
Hold oral medication of metformin   Continue coverage scale insulin - POCTs  above 200s, will adjust insulin  HbA1c - 8%
Hold oral medication of metformin   Start low dose coverage scale   fingerstick glucose checks qac and qhs when diet present   fingerstick glucose checks q6h while NPO  HbA1c in AM

## 2022-02-01 NOTE — PROGRESS NOTE ADULT - PROBLEM SELECTOR PLAN 6
Hx of aortic and mitral valve replacement  On Warfarin 2.5mg, 2 tabs MWF and 1 tab rest of the days  - hold warfarin for now in setting of potential ENT procedure and supratherapeutic INR  - f/up repeat INR in AM  - No indication for reversal at this time  - Cardio consulted, f/u recs
Hx of aortic and mitral valve replacement  On Warfarin 2.5mg, 2 tabs MWF and 1 tab rest of the days  - Check INR daily  - on heparin bridge pending INR in therapeutic range
Hx of aortic and mitral valve replacement  On Warfarin 2.5mg, 2 tabs MWF and 1 tab rest of the days  - hold warfarin for now in setting of potential ENT procedure and supratherapeutic INR  - f/up repeat INR in AM  - No indication for reversal at this time  - Cardio consulted, f/u recs

## 2022-02-01 NOTE — PROGRESS NOTE ADULT - PROBLEM SELECTOR PROBLEM 2
Supratherapeutic INR

## 2022-02-01 NOTE — PROGRESS NOTE ADULT - ASSESSMENT
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 Patient fell 1 week ago and sustained facial trauma, was seen here, had nasal bone fractures and anterior nasal swelling admitted for nasal abscess and supratherapeutic INR. Clinically improved on IV antibiotics, INR reduced, on heparin/warfarin bridge, now dosing warfarin daily as INR subtherapeutic.  PT eval pending - Wife desires home care

## 2022-02-01 NOTE — PROGRESS NOTE ADULT - PROBLEM SELECTOR PLAN 4
BUN/Cr of 32/1.5  - Unknown baseline   - S/p 1L NS  - Will give gentle IVF while NPO   - hold home HCTZ and losartan in setting of ROBBIE  - Hold nephrotoxic agents  - Monitor renal indices
BUN/Cr of 32/1.5 --> now Cr 1.3  - Unknown baseline   - S/p 1L NS  - Will give gentle IVF while NPO   - hold home HCTZ and losartan in setting of ROBBIE  - Hold nephrotoxic agents  - Monitor renal indices
BUN/Cr of 32/1.5  - Unknown baseline   - S/p 1L NS  - Will give gentle IVF while NPO   - hold home HCTZ and losartan in setting of ROBBIE  - Hold nephrotoxic agents  - Monitor renal indices

## 2022-02-01 NOTE — PHYSICAL THERAPY INITIAL EVALUATION ADULT - PHYSICAL ASSIST/NONPHYSICAL ASSIST: STAND/SIT, REHAB EVAL
Procedure(s):  Colonoscopy with indication(s) of abdominal pain and abnormal CT    Endoscopy Pre-Procedure Assessment:  Prior to the procedure, the patient is identified  The patient's history, medications, and allergies have been reviewed  The patient is competent  The risks and benefits of the procedure procedure and the planned sedation have been discussed with the patient  All questions have been answered and informed consent for the procedure has been obtained  Vitals:    08/21/20 1156   BP: 104/56   Pulse: 81   Resp: 18   Temp: 98 °F (36 7 °C)   SpO2: 99%       Physical Exam:  Physical Exam  HENT:      Head: Normocephalic and atraumatic  Neck:      Musculoskeletal: Normal range of motion and neck supple  Cardiovascular:      Rate and Rhythm: Normal rate and regular rhythm  Heart sounds: Normal heart sounds  Pulmonary:      Effort: Pulmonary effort is normal       Breath sounds: Normal breath sounds  Abdominal:      General: Bowel sounds are normal       Palpations: Abdomen is soft  Musculoskeletal: Normal range of motion  Skin:     General: Skin is warm and dry  Neurological:      Mental Status: She is alert and oriented to person, place, and time  ASA Grade:  ASA 2    After reviewed the risks and benefits, the patient is deemed in satisfactory condition to undergo the procedure  The anesthesia plan is to use monitored anesthesia care      Isela Be 888, DO  08/21/20 1 person assist

## 2022-02-02 ENCOUNTER — TRANSCRIPTION ENCOUNTER (OUTPATIENT)
Age: 87
End: 2022-02-02

## 2022-02-02 VITALS
DIASTOLIC BLOOD PRESSURE: 67 MMHG | HEART RATE: 55 BPM | TEMPERATURE: 98 F | OXYGEN SATURATION: 97 % | RESPIRATION RATE: 18 BRPM | SYSTOLIC BLOOD PRESSURE: 119 MMHG

## 2022-02-02 LAB
ALBUMIN SERPL ELPH-MCNC: 2.6 G/DL — LOW (ref 3.3–5)
ALP SERPL-CCNC: 78 U/L — SIGNIFICANT CHANGE UP (ref 40–120)
ALT FLD-CCNC: 23 U/L — SIGNIFICANT CHANGE UP (ref 12–78)
ANION GAP SERPL CALC-SCNC: 5 MMOL/L — SIGNIFICANT CHANGE UP (ref 5–17)
APTT BLD: 116.4 SEC — HIGH (ref 27.5–35.5)
APTT BLD: 68.9 SEC — HIGH (ref 27.5–35.5)
AST SERPL-CCNC: 17 U/L — SIGNIFICANT CHANGE UP (ref 15–37)
BILIRUB SERPL-MCNC: 0.4 MG/DL — SIGNIFICANT CHANGE UP (ref 0.2–1.2)
BUN SERPL-MCNC: 24 MG/DL — HIGH (ref 7–23)
CALCIUM SERPL-MCNC: 8.8 MG/DL — SIGNIFICANT CHANGE UP (ref 8.5–10.1)
CHLORIDE SERPL-SCNC: 107 MMOL/L — SIGNIFICANT CHANGE UP (ref 96–108)
CO2 SERPL-SCNC: 27 MMOL/L — SIGNIFICANT CHANGE UP (ref 22–31)
CREAT SERPL-MCNC: 1.4 MG/DL — HIGH (ref 0.5–1.3)
CULTURE RESULTS: SIGNIFICANT CHANGE UP
GLUCOSE SERPL-MCNC: 196 MG/DL — HIGH (ref 70–99)
HCT VFR BLD CALC: 28.8 % — LOW (ref 39–50)
HGB BLD-MCNC: 9.8 G/DL — LOW (ref 13–17)
INR BLD: 1.84 RATIO — HIGH (ref 0.88–1.16)
MAGNESIUM SERPL-MCNC: 1.8 MG/DL — SIGNIFICANT CHANGE UP (ref 1.6–2.6)
MCHC RBC-ENTMCNC: 31.6 PG — SIGNIFICANT CHANGE UP (ref 27–34)
MCHC RBC-ENTMCNC: 34 GM/DL — SIGNIFICANT CHANGE UP (ref 32–36)
MCV RBC AUTO: 92.9 FL — SIGNIFICANT CHANGE UP (ref 80–100)
NRBC # BLD: 0 /100 WBCS — SIGNIFICANT CHANGE UP (ref 0–0)
PHOSPHATE SERPL-MCNC: 2.7 MG/DL — SIGNIFICANT CHANGE UP (ref 2.5–4.5)
PLATELET # BLD AUTO: 196 K/UL — SIGNIFICANT CHANGE UP (ref 150–400)
POTASSIUM SERPL-MCNC: 4 MMOL/L — SIGNIFICANT CHANGE UP (ref 3.5–5.3)
POTASSIUM SERPL-SCNC: 4 MMOL/L — SIGNIFICANT CHANGE UP (ref 3.5–5.3)
PROT SERPL-MCNC: 6.9 G/DL — SIGNIFICANT CHANGE UP (ref 6–8.3)
PROTHROM AB SERPL-ACNC: 20.9 SEC — HIGH (ref 10.6–13.6)
RBC # BLD: 3.1 M/UL — LOW (ref 4.2–5.8)
RBC # FLD: 13.1 % — SIGNIFICANT CHANGE UP (ref 10.3–14.5)
SODIUM SERPL-SCNC: 139 MMOL/L — SIGNIFICANT CHANGE UP (ref 135–145)
SPECIMEN SOURCE: SIGNIFICANT CHANGE UP
WBC # BLD: 5.03 K/UL — SIGNIFICANT CHANGE UP (ref 3.8–10.5)
WBC # FLD AUTO: 5.03 K/UL — SIGNIFICANT CHANGE UP (ref 3.8–10.5)

## 2022-02-02 PROCEDURE — 85730 THROMBOPLASTIN TIME PARTIAL: CPT

## 2022-02-02 PROCEDURE — 85027 COMPLETE CBC AUTOMATED: CPT

## 2022-02-02 PROCEDURE — 97116 GAIT TRAINING THERAPY: CPT

## 2022-02-02 PROCEDURE — 83735 ASSAY OF MAGNESIUM: CPT

## 2022-02-02 PROCEDURE — 71045 X-RAY EXAM CHEST 1 VIEW: CPT

## 2022-02-02 PROCEDURE — 86901 BLOOD TYPING SEROLOGIC RH(D): CPT

## 2022-02-02 PROCEDURE — 87077 CULTURE AEROBIC IDENTIFY: CPT

## 2022-02-02 PROCEDURE — 80053 COMPREHEN METABOLIC PANEL: CPT

## 2022-02-02 PROCEDURE — 86900 BLOOD TYPING SEROLOGIC ABO: CPT

## 2022-02-02 PROCEDURE — 86850 RBC ANTIBODY SCREEN: CPT

## 2022-02-02 PROCEDURE — 36415 COLL VENOUS BLD VENIPUNCTURE: CPT

## 2022-02-02 PROCEDURE — 99285 EMERGENCY DEPT VISIT HI MDM: CPT | Mod: 25

## 2022-02-02 PROCEDURE — 70487 CT MAXILLOFACIAL W/DYE: CPT | Mod: MA

## 2022-02-02 PROCEDURE — 84100 ASSAY OF PHOSPHORUS: CPT

## 2022-02-02 PROCEDURE — 87635 SARS-COV-2 COVID-19 AMP PRB: CPT

## 2022-02-02 PROCEDURE — 97162 PT EVAL MOD COMPLEX 30 MIN: CPT

## 2022-02-02 PROCEDURE — 97110 THERAPEUTIC EXERCISES: CPT

## 2022-02-02 PROCEDURE — 80048 BASIC METABOLIC PNL TOTAL CA: CPT

## 2022-02-02 PROCEDURE — 87070 CULTURE OTHR SPECIMN AEROBIC: CPT

## 2022-02-02 PROCEDURE — 70450 CT HEAD/BRAIN W/O DYE: CPT

## 2022-02-02 PROCEDURE — 93005 ELECTROCARDIOGRAM TRACING: CPT

## 2022-02-02 PROCEDURE — 83036 HEMOGLOBIN GLYCOSYLATED A1C: CPT

## 2022-02-02 PROCEDURE — 97112 NEUROMUSCULAR REEDUCATION: CPT

## 2022-02-02 PROCEDURE — 83605 ASSAY OF LACTIC ACID: CPT

## 2022-02-02 PROCEDURE — 85610 PROTHROMBIN TIME: CPT

## 2022-02-02 PROCEDURE — 82962 GLUCOSE BLOOD TEST: CPT

## 2022-02-02 PROCEDURE — 87205 SMEAR GRAM STAIN: CPT

## 2022-02-02 PROCEDURE — 99232 SBSQ HOSP IP/OBS MODERATE 35: CPT

## 2022-02-02 PROCEDURE — 87040 BLOOD CULTURE FOR BACTERIA: CPT

## 2022-02-02 PROCEDURE — 99239 HOSP IP/OBS DSCHRG MGMT >30: CPT

## 2022-02-02 PROCEDURE — 96365 THER/PROPH/DIAG IV INF INIT: CPT

## 2022-02-02 PROCEDURE — 85025 COMPLETE CBC W/AUTO DIFF WBC: CPT

## 2022-02-02 RX ORDER — OFLOXACIN 200 MG
1 TABLET ORAL
Qty: 0 | Refills: 0 | DISCHARGE
Start: 2022-02-02

## 2022-02-02 RX ORDER — CEFPODOXIME PROXETIL 100 MG
200 TABLET ORAL EVERY 12 HOURS
Refills: 0 | Status: DISCONTINUED | OUTPATIENT
Start: 2022-02-02 | End: 2022-02-02

## 2022-02-02 RX ORDER — CEFPODOXIME PROXETIL 100 MG
1 TABLET ORAL
Qty: 14 | Refills: 0
Start: 2022-02-02 | End: 2022-02-08

## 2022-02-02 RX ORDER — OFLOXACIN 200 MG
1 TABLET ORAL
Qty: 14 | Refills: 0
Start: 2022-02-02 | End: 2022-02-08

## 2022-02-02 RX ADMIN — HEPARIN SODIUM 1300 UNIT(S)/HR: 5000 INJECTION INTRAVENOUS; SUBCUTANEOUS at 09:46

## 2022-02-02 RX ADMIN — Medication 2: at 17:09

## 2022-02-02 RX ADMIN — Medication 1 DROP(S): at 05:54

## 2022-02-02 RX ADMIN — MUPIROCIN 1 APPLICATION(S): 20 OINTMENT TOPICAL at 05:54

## 2022-02-02 RX ADMIN — Medication 2: at 07:50

## 2022-02-02 RX ADMIN — Medication 2: at 11:45

## 2022-02-02 RX ADMIN — CITALOPRAM 20 MILLIGRAM(S): 10 TABLET, FILM COATED ORAL at 11:45

## 2022-02-02 RX ADMIN — ISOSORBIDE MONONITRATE 30 MILLIGRAM(S): 60 TABLET, EXTENDED RELEASE ORAL at 11:45

## 2022-02-02 RX ADMIN — AMLODIPINE BESYLATE 5 MILLIGRAM(S): 2.5 TABLET ORAL at 05:54

## 2022-02-02 RX ADMIN — HEPARIN SODIUM 1300 UNIT(S)/HR: 5000 INJECTION INTRAVENOUS; SUBCUTANEOUS at 12:09

## 2022-02-02 RX ADMIN — HYDROGEN PEROXIDE 1 APPLICATION(S): 0.3 LIQUID TOPICAL at 05:54

## 2022-02-02 RX ADMIN — INSULIN GLARGINE 7 UNIT(S): 100 INJECTION, SOLUTION SUBCUTANEOUS at 07:53

## 2022-02-02 RX ADMIN — HEPARIN SODIUM 1500 UNIT(S)/HR: 5000 INJECTION INTRAVENOUS; SUBCUTANEOUS at 07:17

## 2022-02-02 RX ADMIN — Medication 5 MILLIGRAM(S): at 00:09

## 2022-02-02 NOTE — PROGRESS NOTE ADULT - PROBLEM SELECTOR PLAN 1
Patient s/p fall 1 week ago, with painful purulent erythematous enlarging nose sent in by ENT, does not meet sepsis criteria  - WBC 16.29  - S/p cefepime 2g IV and vancomycin 1g IV   - c/w vanco 750 mg IV  - Patient may need to go to OR for abscess drainage  - NPO after midnight   - Gentle IVF only while NPO: 75mL/hr stop after 10 hours    - F/u blood cultures and abscess cultures  - F/u CT head   - F/u CT maxillofacial w IV contrast  - ENT Dr. Bowling following  - Will consult Cardio for possible clearance hx of MVR, AVR, with supratherapeutic INR  - ID consulted, Dr. Boyd
cont lantus 7 units qam  cont mod dose admelog corrective scale coverage qac  cont cons cho diet  goal bg conservative mngmt
Sepsis due to nasal abscess  - S/p cefepime 2g IV and vancomycin 1g IV, now on ceftriaxone IV  - no active plan for procedure at this time    - F/u blood cultures and abscess cultures  - F/u CT head   - F/u CT maxillofacial w IV contrast  - ENT Dr. Bowling following  - ID consulted, Dr. Boyd - recommending switch to PO Vantin on DC  Wound care RN c/s
Patient s/p fall 1 week ago, with painful purulent erythematous enlarging nose sent in by ENT, does not meet sepsis criteria  - WBC 16.29  - S/p cefepime 2g IV and vancomycin 1g IV   - c/w vanco 750 mg IV  - Patient may need to go to OR for abscess drainage  - NPO after midnight   - Gentle IVF only while NPO: 75mL/hr stop after 10 hours    - F/u blood cultures and abscess cultures  - F/u CT head   - F/u CT maxillofacial w IV contrast  - ENT Dr. Bowling following  - Will consult Cardio for possible clearance hx of MVR, AVR, with supratherapeutic INR  - ID consulted, Dr. Boyd

## 2022-02-02 NOTE — DISCHARGE NOTE PROVIDER - PROVIDER TOKENS
PROVIDER:[TOKEN:[[id number]],FOLLOWUP:[1 week]],PROVIDER:[TOKEN:[[id number]],FOLLOWUP:[1 week]]

## 2022-02-02 NOTE — PROGRESS NOTE ADULT - REASON FOR ADMISSION
Nasal abscess
Possible Nasal abscess
Nasal abscess
Nasal abscess
Possible Nasal abscess

## 2022-02-02 NOTE — DISCHARGE NOTE PROVIDER - NSDCMRMEDTOKEN_GEN_ALL_CORE_FT
Ambien 5 mg oral tablet: 1 tab(s) orally once a day (at bedtime)  amLODIPine 5 mg oral tablet: 1 tab(s) orally once a day  citalopram 20 mg oral tablet: 1 tab(s) orally once a day  isosorbide mononitrate 30 mg oral tablet, extended release: 1 tab(s) orally once a day (in the morning)  losartan-hydrochlorothiazide 100 mg-25 mg oral tablet: 1 tab(s) orally once a day  metFORMIN 500 mg oral tablet, extended release: 4 tab(s) orally once a day (in the morning)  ofloxacin 0.3% ophthalmic solution: 1 drop(s) to each affected eye 2 times a day  warfarin 2.5 mg oral tablet: 2 tab(s) orally Monday, Wednesday, and Friday  warfarin 2.5 mg oral tablet: 1 tab(s) orally Tuesday, Thursday, Saturday, Sunday   Ambien 5 mg oral tablet: 1 tab(s) orally once a day (at bedtime)  amLODIPine 5 mg oral tablet: 1 tab(s) orally once a day  cefpodoxime 200 mg oral tablet: 1 tab(s) orally every 12 hours  citalopram 20 mg oral tablet: 1 tab(s) orally once a day  isosorbide mononitrate 30 mg oral tablet, extended release: 1 tab(s) orally once a day (in the morning)  losartan-hydrochlorothiazide 100 mg-25 mg oral tablet: 1 tab(s) orally once a day  metFORMIN 500 mg oral tablet, extended release: 4 tab(s) orally once a day (in the morning)  ofloxacin 0.3% ophthalmic solution: 1 drop(s) to each affected eye 2 times a day  warfarin 2.5 mg oral tablet: 2 tab(s) orally Monday, Wednesday, and Friday  warfarin 2.5 mg oral tablet: 1 tab(s) orally Tuesday, Thursday, Saturday, Sunday

## 2022-02-02 NOTE — DISCHARGE NOTE PROVIDER - HOSPITAL COURSE
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 Patient fell 1 week ago and sustained facial trauma, was seen here, had nasal bone fractures and anterior nasal swelling. Pt was discharged and told to follow up with ENT in 2 days, however he developed chest pain and had a work up at [facility], which was unremarkable and was unable to follow up with ENT until today. Today pt saw Dr. Bowling (ENT) in the office and was sent to ED for possible nasal abscess. Wife states that pt's nose has doubled in size with blistering on the outside skin and drainage.     Patient endorses significant worsening in nasal pain, swelling, and purulence over the past couple days. Endorses 2 day hx of worsening headache.Denies acute changes in vision. Endorses nasal congestion and generalized weakness. Patient endorses urinating well without dysuria, changes in urinary frequency or hematuria/pyuria. Patient dnies BM this AM but endorses last BM was regular without hematochezia/melena. Patient denies  fever, chills, tinnitus, cough, SOB, CP, palpitations, abdominal pain, n/v/c/d, confusion, dizziness, paresthesias, weakness, acute changes in vision or hearing, nasal purulence coming out of nares or focal neuro deficits.    ED course:  Vitals: T 97.5, HR 78, /75, RR 16, SpO2 98% on RA  Significant labs: WBC 16.29, H/H: 11.1/32.2, Na 131, Cr 1.5, BUN 32, Glucose 313  CXR: No acute findings. Stable exam  Patient received: 2g cefepime, 1g vancomycin, 1L NS bolus    (27 Jan 2022 17:29)    HOSPITAL COURSE:    Patient was seen by ID and started on IV ceftriaxone for suspected nasal abscess.  Patient's clinical condition and infection improved during hospital stay. Upon discussion of care with ID Dr. Boyd, patient was [place]ed [place] into outpatient setting upon discharge, and cleared for discharge, with plans for outpatient follow up with PMD and ID.  Patient was afebrile ovenright, with no leukocytosis, and Cr was downtrending showing ROBBIE was resolving.    VITAL SIGNS:    Vital Signs Last 24 Hrs  T(C): 36.6 (02 Feb 2022 07:41), Max: 36.8 (01 Feb 2022 20:11)  T(F): 97.8 (02 Feb 2022 07:41), Max: 98.3 (01 Feb 2022 20:11)  HR: 55 (02 Feb 2022 07:41) (55 - 72)  BP: 164/81 (02 Feb 2022 07:41) (122/72 - 164/81)  BP(mean): --  RR: 18 (02 Feb 2022 07:41) (18 - 18)  SpO2: 95% (02 Feb 2022 07:41) (95% - 96%)    PHYSICAL EXAM:     GENERAL: no acute distress  HEENT: neck supple, MMM; erythema on nasal bridges and some crusting  RESPIRATORY: LCTAB/L, no rhonchi, rales, or wheezing  CARDIOVASCULAR: RRR, no murmurs, gallops, rubs  ABDOMINAL: soft, non-tender, non-distended, positive bowel sounds   EXTREMITIES: no clubbing, cyanosis, or edema  NEUROLOGICAL: alert and oriented x 3, non-focal  SKIN: no rashes or lesions   MUSCULOSKELETAL: no gross joint deformity                          9.8    5.03  )-----------( 196      ( 02 Feb 2022 08:52 )             28.8     02-02    139  |  107  |  24<H>  ----------------------------<  196<H>  4.0   |  27  |  1.40<H>    Ca    8.8      02 Feb 2022 08:52  Phos  2.7     02-02  Mg     1.8     02-02    TPro  6.9  /  Alb  2.6<L>  /  TBili  0.4  /  DBili  x   /  AST  17  /  ALT  23  /  AlkPhos  78  02-02

## 2022-02-02 NOTE — DISCHARGE NOTE NURSING/CASE MANAGEMENT/SOCIAL WORK - NSDCVIVACCINE_GEN_ALL_CORE_FT
Tdap; 16-Jan-2022 09:36; Deepika Mcdowell (RN); [employer]; [id number] (Exp. Date: 09-Sep-2023); IntraMuscular; Deltoid Right.; 0.5 milliLiter(s); VIS (VIS Published: 09-May-2013, VIS Presented: 16-Jan-2022);

## 2022-02-02 NOTE — PROGRESS NOTE ADULT - ASSESSMENT
89yM with PMH of HTN, DM2, depression and hx of AVR/MVR, Afib (on warfarin), admitted for nasal abscess requiring IV antibiotics, and nasal fracture. Cardiology consulted for pre operative clearance.     Patient has PPM placed in 2019 for SSS, last interrogated August 2021.  [place] life: 9.3-11.2 yrs.  Report on chart.  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   Cardiac Cath preformed 2/2 to chronic chest pain, medical therapy recommended at the time     Supratherapeutic INR/BioMVR/AVR/Afib/Cardiac Optimization  - s/p Vit K 5 mg PO x 1 on admission for INR of 5.53 -->6.46.    - ENT following no surgical intervention necessary at this time, cont abx per ID  - Today's INR = 1.8  Continue Heparin gtt daily with bridge,  dosing of Coumadin  INR 2.0-3   - Monitor H/H, stable  - EKG: no signs of plaque rupture; Vpaced.  Tele showing Vpaced rhythm with underlying NSR nw tele d/c'd  - Monitor electrolytes, replete to keep K>4 and Mag>2  - No evidence of ischemia, significant cardiac murmur, cardiac arrhythmia or volume overload.  - Cardiac records from Dr. Chung on chart.  PPM interrogation done 11/2021: [place] life 9.3-11.2 yrs.  Record on chart  - Stable from cardiac standpoint.  Will follow up with his private Cardiologist    Will continue to follow   Isabelle GÓMEZ [facility]

## 2022-02-02 NOTE — PROGRESS NOTE ADULT - PROVIDER SPECIALTY LIST ADULT
Endocrinology
Cardiology
Cardiology
ENT
ENT
Infectious Disease
Infectious Disease
ENT
ENT
Hospitalist
Infectious Disease
Cardiology
Hospitalist

## 2022-02-02 NOTE — DISCHARGE NOTE NURSING/CASE MANAGEMENT/SOCIAL WORK - NSDCPEFALRISK_GEN_ALL_CORE
For information on Fall & Injury Prevention, visit: [email or web address] OR  [email or web address] OR  https://www.cdc.gov/steadi/patient.html

## 2022-02-02 NOTE — DISCHARGE NOTE NURSING/CASE MANAGEMENT/SOCIAL WORK - PATIENT PORTAL LINK FT
You can access the FollowMyHealth Patient Portal offered by [facility] by registering at the following website: [email or web address]. By joining [facility]’s FollowMyHealth portal, you will also be able to view your health information using other applications (apps) compatible with our system.

## 2022-02-02 NOTE — DISCHARGE NOTE PROVIDER - NSDCCPCAREPLAN_GEN_ALL_CORE_FT
PRINCIPAL DISCHARGE DIAGNOSIS  Diagnosis: Nasal abscess  Assessment and Plan of Treatment: Given IV ceftriaxone, transition to po [place] for 7 days at home; follow up with ID and PMD

## 2022-02-02 NOTE — PROGRESS NOTE ADULT - SUBJECTIVE AND OBJECTIVE BOX
RO AL is a 89yMale , patient examined and chart reviewed.    INTERVAL HPI/ OVERNIGHT EVENTS:   Feeling better low grade temp last night Tmax 100.8      PAST MEDICAL & SURGICAL HISTORY:  HTN (hypertension)  Type 2 diabetes mellitus  Major depression  Insomnia    For details regarding the patient's social history, family history, and other miscellaneous elements, please refer the initial infectious diseases consultation and/or the admitting history and physical examination for this admission.      ROS:  CONSTITUTIONAL: + fever or chills  EYES:  Negative  blurry vision or double vision  CARDIOVASCULAR:  Negative for chest pain or palpitations  RESPIRATORY:  Negative for cough, wheezing, or SOB   GASTROINTESTINAL:  Negative for nausea, vomiting, diarrhea, constipation, or abdominal pain  GENITOURINARY:  Negative frequency, urgency or dysuria  NEUROLOGIC:  No headache, confusion, dizziness, lightheadedness  All other systems were reviewed and are negative     ALLERGIES:  penicillin (Unknown)      Current inpatient medications :    ANTIBIOTICS/RELEVANT:  cefepime   IVPB 1000 milliGRAM(s) IV Intermittent every 12 hours  vancomycin  IVPB 750 milliGRAM(s) IV Intermittent every 24 hours      acetaminophen     Tablet .. 650 milliGRAM(s) Oral every 6 hours PRN  amLODIPine   Tablet 5 milliGRAM(s) Oral daily  citalopram 20 milliGRAM(s) Oral daily  dextrose 40% Gel 15 Gram(s) Oral once  dextrose 5%. 1000 milliLiter(s) IV Continuous <Continuous>  dextrose 5%. 1000 milliLiter(s) IV Continuous <Continuous>  dextrose 50% Injectable 25 Gram(s) IV Push once  dextrose 50% Injectable 12.5 Gram(s) IV Push once  dextrose 50% Injectable 25 Gram(s) IV Push once  glucagon  Injectable 1 milliGRAM(s) IntraMuscular once  insulin lispro (ADMELOG) corrective regimen sliding scale   SubCutaneous every 6 hours  isosorbide   mononitrate ER Tablet (IMDUR) 30 milliGRAM(s) Oral daily  melatonin 5 milliGRAM(s) Oral at bedtime PRN      Objective:    01-27 @ 07:01  -  01-28 @ 07:00  --------------------------------------------------------  IN: 500 mL / OUT: 1075 mL / NET: -575 mL      T(C): 36.9 (01-28-22 @ 12:35), Max: 38.2 (01-27-22 @ 21:08)  HR: 79 (01-28-22 @ 12:35) (68 - 84)  BP: 115/71 (01-28-22 @ 12:35) (115/71 - 149/78)  RR: 18 (01-28-22 @ 12:35) (16 - 18)  SpO2: 95% (01-28-22 @ 12:35) (95% - 98%)        Physical Exam:  General: Awake Nose swelling with crusting less red and tender  Neck: supple, trachea midline  Lungs: clear, no wheeze/rhonchi  Cardiovascular: regular rate and rhythm, S1 S2  Abdomen: soft, nontender,  bowel sounds normal  Neurological: alert and oriented x3  Skin: no rash  Extremities: no edema            LABS:                          11.0   12.03 )-----------( 194      ( 28 Jan 2022 06:19 )             31.2       01-28    138  |  103  |  28<H>  ----------------------------<  248<H>  3.7   |  26  |  1.20    Ca    8.5      28 Jan 2022 06:19    TPro  6.6  /  Alb  2.5<L>  /  TBili  0.6  /  DBili  x   /  AST  12<L>  /  ALT  17  /  AlkPhos  75  01-28      PT/INR - ( 28 Jan 2022 06:19 )   PT: 69.2 sec;   INR: 6.46 ratio         PTT - ( 28 Jan 2022 06:19 )  PTT:49.9 sec        MICROBIOLOGY:        RADIOLOGY & ADDITIONAL STUDIES:  ACC: [id number] EXAM:  XR CHEST PORTABLE URGENT 1V                          PROCEDURE DATE:  01/27/2022          INTERPRETATION:  Weakness.    AP chest. Prior 1/16/2022.    Median sternotomy cardiac valve surgery. The left ICD reidentified in   situ.No change cardiomegaly. No consolidation effusion or other   pleural-parenchymal abnormality    IMPRESSION: No acute findings. Stable exam      ACC: [id number] EXAM:  CT MAXILLOFACIAL  IC                        ACC: [id number] EXAM:  CT BRAIN                          PROCEDURE DATE:  01/27/2022          INTERPRETATION:  CT HEAD, CT MAXILLOFACIAL WITH IV CONTRAST    Clinical information: Nasal abscess    Noncontrast head CT was performed as well as contrast-enhanced CT of the   face.    TECHNIQUE:  Axial acquisition. Sagittal and coronal reformations.  Contrast administered 90 cc Omnipaque 350 contrast discarded 10 cc.   Contrast was administered for the facial CT.    COMPARISON:  Exams were compared to prior studies of 1/16/2022    FINDINGS:  Head CT:  Mild to moderate atrophy. Mild chronic periventricular white matter   ischemic changes are seen. There is no intracranial hemorrhage. There is   no midline shift or hydrocephalus. No subdural or epidural collection is   seen. No depressed calvarial fracture.    Facial CT:  Comminuted nasal fractures are present with involvement of the frontal   processes of the maxilla as well as thenasal bones and most anterior   nasal septum. Deviation towards the left has decreased as compared to   prior exam.    There is soft tissue swelling involving the nose extending into the   cartilaginous septum and into the nasion/forehead. There are ill-defined   areas of low attenuation involving the nasal soft tissue/nasion   compatible with phlegmon and or early abscess. Findings are worse on the   right measuring up to 1.9 x 1.5 cm axially with overlying dermal   irregularity.    No postseptal extension into the orbits. Mild preseptal soft tissue   swelling cannot be excluded. Orbital contents are within normal limits.    Mild mucosal thickening is seen within the maxillary sinuses. Mild nasal   septal deviation apex towards the right.    IMPRESSION:  NO EVIDENCE OF AN ACUTE TRAUMATIC INTRACRANIAL INJURY. ATROPHY WITH   CHRONIC WHITE MATTER ISCHEMIC CHANGES.  COMMINUTED NASAL FRACTURES. ASSOCIATED SOFT TISSUE SWELLING EXTENDING   INTO THE CARTILAGINOUS SEPTUM AND NASION FOREHEAD. ILL-DEFINED AREAS OF   LOW-ATTENUATION INVOLVING THE NASAL SOFT TISSUES COMPATIBLE WITH PHLEGMON   AND/OR EARLY ABSCESS. 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  CT noted  ENT eval noted  WBC downtrending  ROBBIE better  Better today    Plan :   Vancomycin and Cefepime for now  Fu cultures  ENT to follow up  May need I&D  Trend temps and cbc      Continue with present regiment.  Appropriate use of antibiotics and adverse effects reviewed.      I have discussed the above plan of care with patient in detail. He expressed understanding of the  treatment plan . Risks, benefits and alternatives discussed in detail. I have asked if he has any questions or concerns and appropriately addressed them to the best of my ability .    > 35 minutes were spent in direct patient care reviewing notes, medications ,labs data/ imaging , discussion with multidisciplinary team.    Thank you for allowing me to participate in care of your patient .    Stacy Boyd MD  Infectious Disease  [phone]
[facility] -- Jai Osei Grossman, [place], Jonathan Johnson, Damir Infante: Office # [phone]    Follow Up:   Supratherapeutic INR, BioAVR/MVR/Cardiac optimization      Subjective/Observations: Patient seen and examined. Patient awake, alert, resting comfortably in bed. No complaints of chest pain, dyspnea, palpitations or dizziness. Tolerating room air. Heparin iv gtt     REVIEW OF SYSTEMS: All review of systems is negative for eye, ENT, GI, , allergic, dermatologic, musculoskeletal and neurologic except as described above    PAST MEDICAL & SURGICAL HISTORY:  HTN (hypertension)    Type 2 diabetes mellitus    Major depression    Insomnia    No significant past surgical history        MEDICATIONS  (STANDING):  amLODIPine   Tablet 5 milliGRAM(s) Oral daily  cefTRIAXone   IVPB 1000 milliGRAM(s) IV Intermittent <User Schedule>  citalopram 20 milliGRAM(s) Oral daily  dextrose 40% Gel 15 Gram(s) Oral once  dextrose 5%. 1000 milliLiter(s) (50 mL/Hr) IV Continuous <Continuous>  dextrose 5%. 1000 milliLiter(s) (100 mL/Hr) IV Continuous <Continuous>  dextrose 50% Injectable 25 Gram(s) IV Push once  dextrose 50% Injectable 12.5 Gram(s) IV Push once  dextrose 50% Injectable 25 Gram(s) IV Push once  glucagon  Injectable 1 milliGRAM(s) IntraMuscular once  heparin  Infusion.  Unit(s)/Hr (15 mL/Hr) IV Continuous <Continuous>  hydrogen peroxide 3% Solution 1 Application(s) Topical two times a day  insulin lispro (ADMELOG) corrective regimen sliding scale   SubCutaneous Before meals and at bedtime  insulin lispro Injectable (ADMELOG) 3 Unit(s) SubCutaneous three times a day before meals  isosorbide   mononitrate ER Tablet (IMDUR) 30 milliGRAM(s) Oral daily  mupirocin 2% Nasal 1 Application(s) Nasal two times a day  ofloxacin 0.3% Solution 1 Drop(s) Both EYES two times a day  sodium chloride 0.9%. 1000 milliLiter(s) (50 mL/Hr) IV Continuous <Continuous>  warfarin 3 milliGRAM(s) Oral once    MEDICATIONS  (PRN):  acetaminophen     Tablet .. 650 milliGRAM(s) Oral every 6 hours PRN Temp greater or equal to 38C (100.4F), Mild Pain (1 - 3)  diazepam    Tablet 5 milliGRAM(s) Oral two times a day PRN anxiety  heparin   Injectable 6500 Unit(s) IV Push every 6 hours PRN For aPTT less than 40  heparin   Injectable 3000 Unit(s) IV Push every 6 hours PRN For aPTT between 40 - 57  melatonin 5 milliGRAM(s) Oral at bedtime PRN Insomnia    Allergies    penicillin (Unknown)    Intolerances      Vital Signs Last 24 Hrs  T(C): 36.7 (31 Jan 2022 04:57), Max: 36.7 (30 Jan 2022 21:35)  T(F): 98 (31 Jan 2022 04:57), Max: 98.1 (30 Jan 2022 21:35)  HR: 54 (31 Jan 2022 04:57) (54 - 74)  BP: 153/71 (31 Jan 2022 04:57) (136/63 - 153/71)  BP(mean): --  RR: 18 (31 Jan 2022 04:57) (18 - 18)  SpO2: 93% (31 Jan 2022 04:57) (93% - 94%)  I&O's Summary    30 Jan 2022 07:01  -  31 Jan 2022 07:00  --------------------------------------------------------  IN: 300 mL / OUT: 0 mL / NET: 300 mL          TELE: Not on telemetry   PHYSICAL EXAM:  Appearance: NAD, no distress, alert, Well developed   HEENT: Moist Mucous Membranes, Anicteric + nasal lesion   Cardiovascular: Regular rate and rhythm, Normal S1 S2, No JVD, No murmurs, No rubs, gallops or clicks  Respiratory: Non-labored, Clear to auscultation, No rales, No rhonchi, No wheezing.   Gastrointestinal:  Soft, Non-tender, + BS  Skin: Warm and dry, No visible rashes  + nasal lesion ulcers, No ecchymosis, No cyanosis  Musculoskeletal: No clubbing, No cyanosis, No joint swelling/tenderness  Psychiatry: Mood & affect appropriate  Lymph: No peripheral edema.     LABS: All Labs Reviewed:                        9.2    5.53  )-----------( 197      ( 31 Jan 2022 07:13 )             26.8                         10.1   4.76  )-----------( 205      ( 30 Jan 2022 08:08 )             29.3                         10.2   7.58  )-----------( 205      ( 29 Jan 2022 18:40 )             28.9     31 Jan 2022 07:13    139    |  106    |  26     ----------------------------<  180    3.6     |  27     |  1.30   30 Jan 2022 08:08    138    |  105    |  29     ----------------------------<  240    3.6     |  27     |  1.30   29 Jan 2022 08:20    136    |  104    |  33     ----------------------------<  241    3.4     |  26     |  1.40     Ca    8.2        31 Jan 2022 07:13  Ca    8.5        30 Jan 2022 08:08  Ca    8.4        29 Jan 2022 08:20    TPro  6.4    /  Alb  2.5    /  TBili  0.6    /  DBili  x      /  AST  18     /  ALT  24     /  AlkPhos  71     30 Jan 2022 08:08  TPro  6.5    /  Alb  2.5    /  TBili  0.7    /  DBili  x      /  AST  23     /  ALT  25     /  AlkPhos  75     29 Jan 2022 08:20    PT/INR - ( 31 Jan 2022 07:13 )   PT: 18.0 sec;   INR: 1.57 ratio         PTT - ( 31 Jan 2022 07:13 )  PTT:107.4 sec      12 Lead ECG:   Ventricular Rate 80 BPM    Atrial Rate 80 BPM    QRS Duration 146 ms    Q-T Interval 416 ms    QTC Calculation(Bazett) 479 ms    R Axis -52 degrees    T Axis 127 degrees    Diagnosis Line Wide QRS rhythm with frequent ventricular-paced complexes  Left axis deviation  Left bundle branch block  Abnormal ECG  When compared with ECG of 17-OCT-2021 22:31,  Electronic ventricular pacemaker has replaced Wide QRS rhythm  Confirmed by Ale JARQUIN, Walter ([id number]) on 1/28/2022 11:39:27 AM (01-27-22 @ 16:00)    < from: CT Head No Cont (01.27.22 @ 18:50) >  ACC: [id number] EXAM:  CT MAXILLOFACIAL  IC                        ACC: [id number] EXAM:  CT BRAIN                          PROCEDURE DATE:  01/27/2022          INTERPRETATION:  CT HEAD, CT MAXILLOFACIAL WITH IV CONTRAST    Clinical information: Nasal abscess    Noncontrast head CT was performed as well as contrast-enhanced CT of the   face.    TECHNIQUE:  Axial acquisition. Sagittal and coronal reformations.  Contrast administered 90 cc Omnipaque 350 contrast discarded 10 cc.   Contrast was administered for the facial CT.    COMPARISON:  Exams were compared to prior studies of 1/16/2022    FINDINGS:  Head CT:  Mild to moderate atrophy. Mild chronic periventricular white matter   ischemic changes are seen. There is no intracranial hemorrhage. There is   no midline shift or hydrocephalus. No subdural or epidural collection is   seen. No depressed calvarial fracture.    Facial CT:  Comminuted nasal fractures are present with involvement of the frontal   processes of the maxilla as well as thenasal bones and most anterior   nasal septum. Deviation towards the left has decreased as compared to   prior exam.    There is soft tissue swelling involving the nose extending into the   cartilaginous septum and into the nasion/forehead. There are ill-defined   areas of low attenuation involving the nasal soft tissue/nasion   compatible with phlegmon and or early abscess. Findings are worse on the   right measuring up to 1.9 x 1.5 cm axially with overlying dermal   irregularity.    No postseptal extension into the orbits. Mild preseptal soft tissue   swelling cannot be excluded. Orbital contents are within normal limits.    Mild mucosal thickening is seen within the maxillary sinuses. Mild nasal   septal deviation apex towards the right.    IMPRESSION:  NO EVIDENCE OF AN ACUTE TRAUMATIC INTRACRANIAL INJURY. ATROPHY WITH   CHRONIC WHITE MATTER ISCHEMIC CHANGES.  COMMINUTED NASAL FRACTURES. ASSOCIATED SOFT TISSUE SWELLING EXTENDING   INTO THE CARTILAGINOUS SEPTUM AND NASION FOREHEAD. ILL-DEFINED AREAS OF   LOW-ATTENUATION INVOLVING THE NASAL SOFT TISSUES COMPATIBLE WITH PHLEGMON   AND/OR EARLY ABSCESS. FINDINGS ARE WORSE ON THE RIGHT  --- End of Report ---  < end of copied text >    < from: Xray Chest 1 View- PORTABLE-Urgent (01.27.22 @ 16:12) >  ACC: [id number] EXAM:  XR CHEST PORTABLE URGENT 1V                        PROCEDURE DATE:  01/27/2022    INTERPRETATION:  Weakness.  AP chest. Prior 1/16/2022.  Median sternotomy cardiac valve surgery. The left ICD reidentified in   situ.No change cardiomegaly. No consolidation effusion or other   pleural-parenchymal abnormality  IMPRESSION: No acute findings. Stable exam  --- End of Report --  < end of copied text >  
[facility] -- Jai Osei, lAe, Xavier, Jonathan Johnson Savella, Goodger  Office # [phone]    Follow Up:  Supratherapeutic INR, BioAVR/MVR/Cardiac optimization    Subjective/Observations:     REVIEW OF SYSTEMS: All other review of systems is negative unless indicated above  PAST MEDICAL & SURGICAL HISTORY:  HTN (hypertension)    Type 2 diabetes mellitus    Major depression    Insomnia    No significant past surgical history    MEDICATIONS  (STANDING):  amLODIPine   Tablet 5 milliGRAM(s) Oral daily  cefTRIAXone   IVPB 1000 milliGRAM(s) IV Intermittent <User Schedule>  citalopram 20 milliGRAM(s) Oral daily  dextrose 40% Gel 15 Gram(s) Oral once  dextrose 5%. 1000 milliLiter(s) (50 mL/Hr) IV Continuous <Continuous>  dextrose 5%. 1000 milliLiter(s) (100 mL/Hr) IV Continuous <Continuous>  dextrose 50% Injectable 25 Gram(s) IV Push once  dextrose 50% Injectable 12.5 Gram(s) IV Push once  dextrose 50% Injectable 25 Gram(s) IV Push once  glucagon  Injectable 1 milliGRAM(s) IntraMuscular once  heparin  Infusion.  Unit(s)/Hr (15 mL/Hr) IV Continuous <Continuous>  hydrogen peroxide 3% Solution 1 Application(s) Topical two times a day  insulin lispro (ADMELOG) corrective regimen sliding scale   SubCutaneous Before meals and at bedtime  insulin lispro Injectable (ADMELOG) 3 Unit(s) SubCutaneous three times a day before meals  isosorbide   mononitrate ER Tablet (IMDUR) 30 milliGRAM(s) Oral daily  mupirocin 2% Nasal 1 Application(s) Nasal two times a day  ofloxacin 0.3% Solution 1 Drop(s) Both EYES two times a day  sodium chloride 0.9%. 1000 milliLiter(s) (50 mL/Hr) IV Continuous <Continuous>  warfarin 2.5 milliGRAM(s) Oral once    MEDICATIONS  (PRN):  acetaminophen     Tablet .. 650 milliGRAM(s) Oral every 6 hours PRN Temp greater or equal to 38C (100.4F), Mild Pain (1 - 3)  diazepam    Tablet 5 milliGRAM(s) Oral two times a day PRN anxiety  heparin   Injectable 6500 Unit(s) IV Push every 6 hours PRN For aPTT less than 40  heparin   Injectable 3000 Unit(s) IV Push every 6 hours PRN For aPTT between 40 - 57  melatonin 5 milliGRAM(s) Oral at bedtime PRN Insomnia    Allergies    penicillin (Unknown)    Intolerances    Vital Signs Last 24 Hrs  T(C): 36.7 (30 Jan 2022 05:07), Max: 36.7 (29 Jan 2022 21:44)  T(F): 98.1 (30 Jan 2022 05:07), Max: 98.1 (29 Jan 2022 21:44)  HR: 56 (30 Jan 2022 05:07) (56 - 58)  BP: 168/81 (30 Jan 2022 05:07) (162/81 - 168/81)  BP(mean): --  RR: 18 (30 Jan 2022 05:07) (18 - 18)  SpO2: 92% (30 Jan 2022 05:07) (92% - 96%)  I&O's Summary    29 Jan 2022 07:01  -  30 Jan 2022 07:00  --------------------------------------------------------  IN: 690 mL / OUT: 0 mL / NET: 690 mL    PHYSICAL EXAM:  TELE: Vpacing with underlying NSR  Constitutional: NAD, awake and alert, obese  HEENT: Moist Mucous Membranes, Anicteric +facial/nasal erythema/swelling +tenderness  Pulmonary: Non-labored, breath sounds are clear bilaterally, No wheezing, rales or rhonchi  Cardiovascular: Regular, S1 and S2, +murmurs, no rubs, gallops or clicks  Gastrointestinal: Bowel Sounds present, soft, nontender.   Lymph: No peripheral edema. No lymphadenopathy.  Skin: No visible rashes or ulcers.  +facial ecchymoses  Psych:  Mood & affect appropriate     LABS: All Labs Reviewed:                        10.1   4.76  )-----------( 205      ( 30 Jan 2022 08:08 )             29.3                         10.2   7.58  )-----------( 205      ( 29 Jan 2022 18:40 )             28.9                         9.9    7.55  )-----------( 198      ( 29 Jan 2022 08:20 )             28.2     30 Jan 2022 08:08    138    |  105    |  29     ----------------------------<  240    3.6     |  27     |  1.30   29 Jan 2022 08:20    136    |  104    |  33     ----------------------------<  241    3.4     |  26     |  1.40   28 Jan 2022 06:19    138    |  103    |  28     ----------------------------<  248    3.7     |  26     |  1.20     Ca    8.5        30 Jan 2022 08:08  Ca    8.4        29 Jan 2022 08:20  Ca    8.5        28 Jan 2022 06:19    TPro  6.4    /  Alb  2.5    /  TBili  0.6    /  DBili  x      /  AST  18     /  ALT  24     /  AlkPhos  71     30 Jan 2022 08:08  TPro  6.5    /  Alb  2.5    /  TBili  0.7    /  DBili  x      /  AST  23     /  ALT  25     /  AlkPhos  75     29 Jan 2022 08:20  TPro  6.6    /  Alb  2.5    /  TBili  0.6    /  DBili  x      /  AST  12     /  ALT  17     /  AlkPhos  75     28 Jan 2022 06:19    PT/INR - ( 30 Jan 2022 08:08 )   PT: 17.3 sec;   INR: 1.51 ratio      PTT - ( 30 Jan 2022 08:08 )  PTT:78.8 sec    ACC: [id number] EXAM:  XR CHEST PORTABLE URGENT 1V                          PROCEDURE DATE:  01/27/2022      INTERPRETATION:  Weakness.    AP chest. Prior 1/16/2022.    Median sternotomy cardiac valve surgery. The left ICD reidentified in   situ.No change cardiomegaly. No consolidation effusion or other   pleural-parenchymal abnormality    IMPRESSION: No acute findings. Stable exam    --- End of Report ---    JHON MADISON MD; Attending Radiologist  This document has been electronically signed. Jan 27 2022  4:21PM    ACC: [id number] EXAM:  CT MAXILLOFACIAL  IC                        ACC: [id number] EXAM:  CT BRAIN                          PROCEDURE DATE:  01/27/2022      INTERPRETATION:  CT HEAD, CT MAXILLOFACIAL WITH IV CONTRAST    Clinical information: Nasal abscess    Noncontrast head CT was performed as well as contrast-enhanced CT of the   face.    TECHNIQUE:  Axial acquisition. Sagittal and coronal reformations.  Contrast administered 90 cc Omnipaque 350 contrast discarded 10 cc.   Contrast was administered for the facial CT.    COMPARISON:  Exams were compared to prior studies of 1/16/2022    FINDINGS:  Head CT:  Mild to moderate atrophy. Mild chronic periventricular white matter   ischemic changes are seen. There is no intracranial hemorrhage. There is   no midline shift or hydrocephalus. No subdural or epidural collection is   seen. No depressed calvarial fracture.    Facial CT:  Comminuted nasal fractures are present with involvement of the frontal   processes of the maxilla as well as thenasal bones and most anterior   nasal septum. Deviation towards the left has decreased as compared to   prior exam.    There is soft tissue swelling involving the nose extending into the   cartilaginous septum and into the nasion/forehead. There are ill-defined   areas of low attenuation involving the nasal soft tissue/nasion   compatible with phlegmon and or early abscess. Findings are worse on the   right measuring up to 1.9 x 1.5 cm axially with overlying dermal   irregularity.    No postseptal extension into the orbits. Mild preseptal soft tissue   swelling cannot be excluded. Orbital contents are within normal limits.    Mild mucosal thickening is seen within the maxillary sinuses. Mild nasal   septal deviation apex towards the right.    IMPRESSION:  NO EVIDENCE OF AN ACUTE TRAUMATIC INTRACRANIAL INJURY. ATROPHY WITH   CHRONIC WHITE MATTER ISCHEMIC CHANGES.  COMMINUTED NASAL FRACTURES. ASSOCIATED SOFT TISSUE SWELLING EXTENDING   INTO THE CARTILAGINOUS SEPTUM AND NASION FOREHEAD. ILL-DEFINED AREAS OF   LOW-ATTENUATION INVOLVING THE NASAL SOFT TISSUES COMPATIBLE WITH PHLEGMON   AND/OR EARLY ABSCESS. FINDINGS ARE WORSE ON THE RIGHT    --- End of Report ---    IVY SEVILLA MD; Attending Radiologist  This document has been electronically signed. Jan 28 2022  8:54AM    Ventricular Rate 80 BPM    Atrial Rate 80 BPM    QRS Duration 146 ms    Q-T Interval 416 ms    QTC Calculation(Bazett) 479 ms    R Axis -52 degrees    T Axis 127 degrees    Diagnosis Line Wide QRS rhythm with frequent ventricular-paced complexes  Left axis deviation  Left bundle branch block  Abnormal ECG  When compared with ECG of 17-OCT-2021 22:31,  Electronic ventricular pacemaker has replaced Wide QRS rhythm  Confirmed by Ale JARQUIN, Walter ([id number]) on 1/28/2022 11:39:27 AM        
[facility] -- Jai Osei, Ale, Xavier, Jonathan Johnson, Damir Infante  Office # [phone]    Follow Up:  Supratherapeutic INR, Mechanical MVR/AVR/cardiac optimization    Subjective/Observations:     REVIEW OF SYSTEMS: All other review of systems is negative unless indicated above  PAST MEDICAL & SURGICAL HISTORY:  HTN (hypertension)    Type 2 diabetes mellitus    Major depression    Insomnia    No significant past surgical history      MEDICATIONS  (STANDING):  amLODIPine   Tablet 5 milliGRAM(s) Oral daily  cefepime   IVPB 1000 milliGRAM(s) IV Intermittent every 12 hours  citalopram 20 milliGRAM(s) Oral daily  dextrose 40% Gel 15 Gram(s) Oral once  dextrose 5%. 1000 milliLiter(s) (50 mL/Hr) IV Continuous <Continuous>  dextrose 5%. 1000 milliLiter(s) (100 mL/Hr) IV Continuous <Continuous>  dextrose 50% Injectable 25 Gram(s) IV Push once  dextrose 50% Injectable 12.5 Gram(s) IV Push once  dextrose 50% Injectable 25 Gram(s) IV Push once  glucagon  Injectable 1 milliGRAM(s) IntraMuscular once  heparin  Infusion.  Unit(s)/Hr (15 mL/Hr) IV Continuous <Continuous>  hydrogen peroxide 3% Solution 1 Application(s) Topical two times a day  insulin lispro (ADMELOG) corrective regimen sliding scale   SubCutaneous Before meals and at bedtime  insulin lispro Injectable (ADMELOG) 3 Unit(s) SubCutaneous three times a day before meals  isosorbide   mononitrate ER Tablet (IMDUR) 30 milliGRAM(s) Oral daily  mupirocin 2% Nasal 1 Application(s) Nasal two times a day  ofloxacin 0.3% Solution 1 Drop(s) Both EYES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 <Continuous>  dextrose 5%. 1000 milliLiter(s) (100 mL/Hr) IV Continuous <Continuous>  dextrose 50% Injectable 25 Gram(s) IV Push once  dextrose 50% Injectable 12.5 Gram(s) IV Push once  dextrose 50% Injectable 25 Gram(s) IV Push once  glucagon  Injectable 1 milliGRAM(s) IntraMuscular once  heparin  Infusion.  Unit(s)/Hr (15 mL/Hr) IV Continuous <Continuous>  hydrogen peroxide 3% Solution 1 Application(s) Topical two times a day  insulin glargine Injectable (LANTUS) 7 Unit(s) SubCutaneous every morning  insulin lispro (ADMELOG) corrective regimen sliding scale   SubCutaneous three times a day before meals  insulin lispro (ADMELOG) corrective regimen sliding scale   SubCutaneous at bedtime  isosorbide   mononitrate ER Tablet (IMDUR) 30 milliGRAM(s) Oral daily  mupirocin 2% Nasal 1 Application(s) Nasal two times a day  ofloxacin 0.3% Solution 1 Drop(s) Both EYES two times a day  sodium chloride 0.9%. 1000 milliLiter(s) (50 mL/Hr) IV Continuous <Continuous>    MEDICATIONS  (PRN):  acetaminophen     Tablet .. 650 milliGRAM(s) Oral every 6 hours PRN Temp greater or equal to 38C (100.4F), Mild Pain (1 - 3)  diazepam    Tablet 5 milliGRAM(s) Oral two times a day PRN anxiety  heparin   Injectable 6500 Unit(s) IV Push every 6 hours PRN For aPTT less than 40  heparin   Injectable 3000 Unit(s) IV Push every 6 hours PRN For aPTT between 40 - 57  melatonin 5 milliGRAM(s) Oral at bedtime PRN Insomnia    Objective:  Vital Signs Last 24 Hrs  T(C): 36.7 (02-02-22 @ 13:07), Max: 36.8 (02-01-22 @ 20:11)  T(F): 98 (02-02-22 @ 13:07), Max: 98.3 (02-01-22 @ 20:11)  HR: 55 (02-02-22 @ 13:07) (55 - 72)  BP: 119/67 (02-02-22 @ 13:07) (119/67 - 164/81)  RR: 18 (02-02-22 @ 13:07) (18 - 18)  SpO2: 97% (02-02-22 @ 13:07) (95% - 97%)    Physical Exam:  General: Awake Nose swelling and redness improved, +scabbed  Neck: supple, trachea midline  Lungs: clear, no wheeze/rhonchi  Cardiovascular: regular rate and rhythm, S1 S2  Abdomen: soft, nontender,  bowel sounds normal  Neurological: alert and oriented x3  Skin: no rash  Extremities: no edema        LABS:                        9.8    5.03  )-----------( 196      ( 02 Feb 2022 08:52 )             28.8   02-02    139  |  107  |  24<H>  ----------------------------<  196<H>  4.0   |  27  |  1.40<H>    Ca    8.8      02 Feb 2022 08:52  Phos  2.7     02-02  Mg     1.8     02-02    TPro  6.9  /  Alb  2.6<L>  /  TBili  0.4  /  DBili  x   /  AST  17  /  ALT  23  /  AlkPhos  78  02-02    MICROBIOLOGY:    Culture - Blood (collected 27 Jan 2022 22:26)  Source: .Blood Blood-Peripheral  Preliminary Report (28 Jan 2022 23:01):    No growth to date.    Culture - Blood (collected 27 Jan 2022 22:26)  Source: .Blood Blood-Peripheral  Preliminary Report (28 Jan 2022 23:01):    No growth to date.    Culture - Abscess with Gram Stain (collected 27 Jan 2022 22:21)  Source: .Abscess right nose  Preliminary Report (29 Jan 2022 09:28):    Numerous Streptococcus pyogenes (Group A)    Penicillin and ampicillin are drugs of choice for    treatment of beta-hemolytic streptococcal infections.    Susceptibility testing is not performed routinely because    S. pyogenes (GAS) is universally susceptible to penicillin    and resistance in other strains is extremely rare.      RADIOLOGY & ADDITIONAL STUDIES:  ACC: [id number] EXAM:  XR CHEST PORTABLE URGENT 1V                          PROCEDURE DATE:  01/27/2022          INTERPRETATION:  Weakness.    AP chest. Prior 1/16/2022.    Median sternotomy cardiac valve surgery. The left ICD reidentified in   situ.No change cardiomegaly. No consolidation effusion or other   pleural-parenchymal abnormality    IMPRESSION: No acute findings. Stable exam      ACC: [id number] EXAM:  CT MAXILLOFACIAL  IC                        ACC: [id number] EXAM:  CT BRAIN                          PROCEDURE DATE:  01/27/2022          INTERPRETATION:  CT HEAD, CT MAXILLOFACIAL WITH IV CONTRAST    Clinical information: Nasal abscess    Noncontrast head CT was performed as well as contrast-enhanced CT of the   face.    TECHNIQUE:  Axial acquisition. Sagittal and coronal reformations.  Contrast administered 90 cc Omnipaque 350 contrast discarded 10 cc.   Contrast was administered for the facial CT.    COMPARISON:  Exams were compared to prior studies of 1/16/2022    FINDINGS:  Head CT:  Mild to moderate atrophy. Mild chronic periventricular white matter   ischemic changes are seen. There is no intracranial hemorrhage. There is   no midline shift or hydrocephalus. No subdural or epidural collection is   seen. No depressed calvarial fracture.    Facial CT:  Comminuted nasal fractures are present with involvement of the frontal   processes of the maxilla as well as thenasal bones and most anterior   nasal septum. Deviation towards the left has decreased as compared to   prior exam.    There is soft tissue swelling involving the nose extending into the   cartilaginous septum and into the nasion/forehead. There are ill-defined   areas of low attenuation involving the nasal soft tissue/nasion   compatible with phlegmon and or early abscess. Findings are worse on the   right measuring up to 1.9 x 1.5 cm axially with overlying dermal   irregularity.    No postseptal extension into the orbits. Mild preseptal soft tissue   swelling cannot be excluded. Orbital contents are within normal limits.    Mild mucosal thickening is seen within the maxillary sinuses. Mild nasal   septal deviation apex towards the right.    IMPRESSION:  NO EVIDENCE OF AN ACUTE TRAUMATIC INTRACRANIAL INJURY. ATROPHY WITH   CHRONIC WHITE MATTER ISCHEMIC CHANGES.  COMMINUTED NASAL FRACTURES. ASSOCIATED SOFT TISSUE SWELLING EXTENDING   INTO THE CARTILAGINOUS SEPTUM AND NASION FOREHEAD. ILL-DEFINED AREAS OF   LOW-ATTENUATION INVOLVING THE NASAL SOFT TISSUES COMPATIBLE WITH PHLEGMON   AND/OR EARLY ABSCESS. 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 culture with GAS.  CT noted  ENT follow up noted  WBC downtrending  ROBBIE better  Clinically better    Plan :   Off Rocephin  Change to po Vantin x 7 days for dc   Trend temps and cbc  Stable from ID standpoint    D/w Dr Kevin      Continue with present regiment.  Appropriate use of antibiotics and adverse effects reviewed.      I have discussed the above plan of care with patient in detail. He expressed understanding of the  treatment plan . Risks, benefits and alternatives discussed in detail. I have asked if he has any questions or concerns and appropriately addressed them to the best of my ability .    > 35 minutes were spent in direct patient care reviewing notes, medications ,labs data/ imaging , discussion with multidisciplinary team.    Thank you for allowing me to participate in care of your patient .    Stacy Boyd MD  Infectious Disease  [phone]
CAPILLARY BLOOD GLUCOSE      POCT Blood Glucose.: 181 mg/dL (02 Feb 2022 16:36)  POCT Blood Glucose.: 195 mg/dL (02 Feb 2022 11:16)  POCT Blood Glucose.: 179 mg/dL (02 Feb 2022 07:27)  POCT Blood Glucose.: 199 mg/dL (01 Feb 2022 21:19)      Vital Signs Last 24 Hrs  T(C): 36.7 (02 Feb 2022 13:07), Max: 36.8 (01 Feb 2022 20:11)  T(F): 98 (02 Feb 2022 13:07), Max: 98.3 (01 Feb 2022 20:11)  HR: 55 (02 Feb 2022 13:07) (55 - 72)  BP: 119/67 (02 Feb 2022 13:07) (119/67 - 164/81)  BP(mean): --  RR: 18 (02 Feb 2022 13:07) (18 - 18)  SpO2: 97% (02 Feb 2022 13:07) (95% - 97%)    General: WN/WD NAD  Respiratory: CTA B/L  CV: RRR, S1S2, no murmurs, rubs or gallops  Abdominal: Soft, NT, ND +BS, Last BM  Extremities: No edema, + peripheral pulses     02-02    139  |  107  |  24<H>  ----------------------------<  196<H>  4.0   |  27  |  1.40<H>    Ca    8.8      02 Feb 2022 08:52  Phos  2.7     02-02  Mg     1.8     02-02    TPro  6.9  /  Alb  2.6<L>  /  TBili  0.4  /  DBili  x   /  AST  17  /  ALT  23  /  AlkPhos  78  02-02      dextrose 40% Gel 15 Gram(s) Oral once  dextrose 50% Injectable 25 Gram(s) IV Push once  dextrose 50% Injectable 12.5 Gram(s) IV Push once  dextrose 50% Injectable 25 Gram(s) IV Push once  glucagon  Injectable 1 milliGRAM(s) IntraMuscular once  insulin glargine Injectable (LANTUS) 7 Unit(s) SubCutaneous every morning  insulin lispro (ADMELOG) corrective regimen sliding scale   SubCutaneous three times a day before meals  insulin lispro (ADMELOG) corrective regimen sliding scale   SubCutaneous at bedtime  
Patient seen/examined, markedly improved. Endorses feeling better with more energy.     VSS  AAOx3, NAD  Facial cellulitis involving the entire nose with purulent drainage and crusting  Chemosis of the eyes with less purulent drainage
Patient seen/examined, looks 90% improved. Patient endorses feeling better as well    VSS  Gen: AAOx3, NAD, mildly confused  HEENT: Nasal crusting with significant improvement in cellulitis and edema, mild induration, no fluctuance, eyes no longer have purulent drainage
Patient seen/examined, resting comfortably in bed. Continues to improve.     VSS  AAOx3, NAD, mildly confused  Resolution of nasal abscess, mild cellulitic changes, crusting
[facility] -- Jai Osei Grossman, Xavier, Jonathan Johnson, Damir Infante: Office # [phone]    Follow Up:   Supratherapeutic INR, BioAVR/MVR/Cardiac optimization    Subjective/Observations: Patient seen and examined. Patient awake, alert, resting comfortably in bed. No complaints of chest pain, dyspnea, palpitations or dizziness. Tolerating room air. Heparin IV gtt infusing    REVIEW OF SYSTEMS: All review of systems is negative for eye, ENT, GI, , allergic, dermatologic, musculoskeletal and neurologic except as described above    PAST MEDICAL & SURGICAL HISTORY:  HTN (hypertension)    Type 2 diabetes mellitus    Major depression    Insomnia    No significant past surgical history        MEDICATIONS  (STANDING):  amLODIPine   Tablet 5 milliGRAM(s) Oral daily  cefTRIAXone   IVPB 1000 milliGRAM(s) IV Intermittent <User Schedule>  citalopram 20 milliGRAM(s) Oral daily  dextrose 40% Gel 15 Gram(s) Oral once  dextrose 5%. 1000 milliLiter(s) (50 mL/Hr) IV Continuous <Continuous>  dextrose 5%. 1000 milliLiter(s) (100 mL/Hr) IV Continuous <Continuous>  dextrose 50% Injectable 25 Gram(s) IV Push once  dextrose 50% Injectable 12.5 Gram(s) IV Push once  dextrose 50% Injectable 25 Gram(s) IV Push once  glucagon  Injectable 1 milliGRAM(s) IntraMuscular once  heparin  Infusion.  Unit(s)/Hr (15 mL/Hr) IV Continuous <Continuous>  hydrogen peroxide 3% Solution 1 Application(s) Topical two times a day  insulin lispro (ADMELOG) corrective regimen sliding scale   SubCutaneous Before meals and at bedtime  insulin lispro Injectable (ADMELOG) 3 Unit(s) SubCutaneous three times a day before meals  isosorbide   mononitrate ER Tablet (IMDUR) 30 milliGRAM(s) Oral daily  mupirocin 2% Nasal 1 Application(s) Nasal two times a day  ofloxacin 0.3% Solution 1 Drop(s) Both EYES two times a day  sodium chloride 0.9%. 1000 milliLiter(s) (50 mL/Hr) IV Continuous <Continuous>    MEDICATIONS  (PRN):  acetaminophen     Tablet .. 650 milliGRAM(s) Oral every 6 hours PRN Temp greater or equal to 38C (100.4F), Mild Pain (1 - 3)  diazepam    Tablet 5 milliGRAM(s) Oral two times a day PRN anxiety  heparin   Injectable 6500 Unit(s) IV Push every 6 hours PRN For aPTT less than 40  heparin   Injectable 3000 Unit(s) IV Push every 6 hours PRN For aPTT between 40 - 57  melatonin 5 milliGRAM(s) Oral at bedtime PRN Insomnia    Allergies    penicillin (Rash)    Intolerances      Vital Signs Last 24 Hrs  T(C): 36.8 (01 Feb 2022 05:10), Max: 36.8 (01 Feb 2022 05:10)  T(F): 98.2 (01 Feb 2022 05:10), Max: 98.2 (01 Feb 2022 05:10)  HR: 70 (01 Feb 2022 05:10) (70 - 81)  BP: 152/68 (01 Feb 2022 05:10) (122/75 - 152/68)  BP(mean): --  RR: 18 (01 Feb 2022 05:10) (18 - 18)  SpO2: 95% (01 Feb 2022 05:10) (95% - 97%)  I&O's Summary    31 Jan 2022 07:01  -  01 Feb 2022 07:00  --------------------------------------------------------  IN: 150 mL / OUT: 1800 mL / NET: -1650 mL          TELE: Not on telemetry   PHYSICAL EXAM:  Appearance: NAD, no distress, alert, Well developed   HEENT: Moist Mucous Membranes, Anicteric + nose lesion dry scabbed  Cardiovascular: Regular rate and rhythm, Normal S1 S2, No JVD, No murmurs, No rubs, gallops or clicks  Respiratory: Non-labored, Clear to auscultation, No rales, No rhonchi, No wheezing.   Gastrointestinal:  Soft, Non-tender, + BS  Skin: Warm and dry, No visible rashes or ulcers, No ecchymosis, No cyanosis  Musculoskeletal: No clubbing, No cyanosis, No joint swelling/tenderness  Psychiatry: Mood & affect appropriate  Lymph: No peripheral edema.     LABS: All Labs Reviewed:                        9.1    6.87  )-----------( 191      ( 01 Feb 2022 07:48 )             26.9                         9.2    5.53  )-----------( 197      ( 31 Jan 2022 07:13 )             26.8                         10.1   4.76  )-----------( 205      ( 30 Jan 2022 08:08 )             29.3     01 Feb 2022 07:48    138    |  106    |  25     ----------------------------<  199    3.9     |  27     |  1.30   31 Jan 2022 07:13    139    |  106    |  26     ----------------------------<  180    3.6     |  27     |  1.30   30 Jan 2022 08:08    138    |  105    |  29     ----------------------------<  240    3.6     |  27     |  1.30     Ca    8.3        01 Feb 2022 07:48  Ca    8.2        31 Jan 2022 07:13  Ca    8.5        30 Jan 2022 08:08    TPro  6.4    /  Alb  2.5    /  TBili  0.6    /  DBili  x      /  AST  18     /  ALT  24     /  AlkPhos  71     30 Jan 2022 08:08    PT/INR - ( 01 Feb 2022 07:48 )   PT: 19.5 sec;   INR: 1.71 ratio         PTT - ( 01 Feb 2022 07:48 )  PTT:88.0 sec      12 Lead ECG:   Ventricular Rate 80 BPM    Atrial Rate 80 BPM    QRS Duration 146 ms    Q-T Interval 416 ms    QTC Calculation(Bazett) 479 ms    R Axis -52 degrees    T Axis 127 degrees    Diagnosis Line Wide QRS rhythm with frequent ventricular-paced complexes  Left axis deviation  Left bundle branch block  Abnormal ECG  When compared with ECG of 17-OCT-2021 22:31,  Electronic ventricular pacemaker has replaced Wide QRS rhythm  Confirmed by Walter Aguilera MD ([id number]) on 1/28/2022 11:39:27 AM (01-27-22 @ 16:00)    < from: CT Head No Cont (01.27.22 @ 18:50) >  ACC: [id number] EXAM:  CT MAXILLOFACIAL  IC                        ACC: [id number] EXAM:  CT BRAIN                          PROCEDURE DATE:  01/27/2022          INTERPRETATION:  CT HEAD, CT MAXILLOFACIAL WITH IV CONTRAST    Clinical information: Nasal abscess    Noncontrast head CT was performed as well as contrast-enhanced CT of the   face.    TECHNIQUE:  Axial acquisition. Sagittal and coronal reformations.  Contrast administered 90 cc Omnipaque 350 contrast discarded 10 cc.   Contrast was administered for the facial CT.    COMPARISON:  Exams were compared to prior studies of 1/16/2022    FINDINGS:  Head CT:  Mild to moderate atrophy. Mild chronic periventricular white matter   ischemic changes are seen. There is no intracranial hemorrhage. There is   no midline shift or hydrocephalus. No subdural or epidural collection is   seen. No depressed calvarial fracture.    Facial CT:  Comminuted nasal fractures are present with involvement of the frontal   processes of the maxilla as well as thenasal bones and most anterior   nasal septum. Deviation towards the left has decreased as compared to   prior exam.    There is soft tissue swelling involving the nose extending into the   cartilaginous septum and into the nasion/forehead. There are ill-defined   areas of low attenuation involving the nasal soft tissue/nasion   compatible with phlegmon and or early abscess. Findings are worse on the   right measuring up to 1.9 x 1.5 cm axially with overlying dermal   irregularity.    No postseptal extension into the orbits. Mild preseptal soft tissue   swelling cannot be excluded. Orbital contents are within normal limits.    Mild mucosal thickening is seen within the maxillary sinuses. Mild nasal   septal deviation apex towards the right.    IMPRESSION:  NO EVIDENCE OF AN ACUTE TRAUMATIC INTRACRANIAL INJURY. ATROPHY WITH   CHRONIC WHITE MATTER ISCHEMIC CHANGES.  COMMINUTED NASAL FRACTURES. ASSOCIATED SOFT TISSUE SWELLING EXTENDING   INTO THE CARTILAGINOUS SEPTUM AND NASION FOREHEAD. ILL-DEFINED AREAS OF   LOW-ATTENUATION INVOLVING THE NASAL SOFT TISSUES COMPATIBLE WITH PHLEGMON   AND/OR EARLY ABSCESS. FINDINGS ARE WORSE ON THE RIGHT  --- End of Report ---  < end of copied text >    < from: Xray Chest 1 View- PORTABLE-Urgent (01.27.22 @ 16:12) >  ACC: [id number] EXAM:  XR CHEST PORTABLE URGENT 1V                        PROCEDURE DATE:  01/27/2022    INTERPRETATION:  Weakness.  AP chest. Prior 1/16/2022.  Median sternotomy cardiac valve surgery. The left ICD reidentified in   situ.No change cardiomegaly. No consolidation effusion or other   pleural-parenchymal abnormality  IMPRESSION: No acute findings. Stable exam  --- End of Report --  < end of copied text >      
Patient is a 89y old  Male who presents with a chief complaint of Possible Nasal abscess (31 Jan 2022 11:29)      INTERVAL HPI/OVERNIGHT EVENTS: Pt seen and examined at bedside. admits improved nasal pain/swelling.     MEDICATIONS  (STANDING):  amLODIPine   Tablet 5 milliGRAM(s) Oral daily  cefTRIAXone   IVPB 1000 milliGRAM(s) IV Intermittent <User Schedule>  citalopram 20 milliGRAM(s) Oral daily  dextrose 40% Gel 15 Gram(s) Oral once  dextrose 5%. 1000 milliLiter(s) (50 mL/Hr) IV Continuous <Continuous>  dextrose 5%. 1000 milliLiter(s) (100 mL/Hr) IV Continuous <Continuous>  dextrose 50% Injectable 25 Gram(s) IV Push once  dextrose 50% Injectable 12.5 Gram(s) IV Push once  dextrose 50% Injectable 25 Gram(s) IV Push once  glucagon  Injectable 1 milliGRAM(s) IntraMuscular once  heparin  Infusion.  Unit(s)/Hr (15 mL/Hr) IV Continuous <Continuous>  hydrogen peroxide 3% Solution 1 Application(s) Topical two times a day  insulin lispro (ADMELOG) corrective regimen sliding scale   SubCutaneous Before meals and at bedtime  insulin lispro Injectable (ADMELOG) 3 Unit(s) SubCutaneous three times a day before meals  isosorbide   mononitrate ER Tablet (IMDUR) 30 milliGRAM(s) Oral daily  mupirocin 2% Nasal 1 Application(s) Nasal two times a day  ofloxacin 0.3% Solution 1 Drop(s) Both EYES two times a day  sodium chloride 0.9%. 1000 milliLiter(s) (50 mL/Hr) IV Continuous <Continuous>  warfarin 3 milliGRAM(s) Oral once    MEDICATIONS  (PRN):  acetaminophen     Tablet .. 650 milliGRAM(s) Oral every 6 hours PRN Temp greater or equal to 38C (100.4F), Mild Pain (1 - 3)  diazepam    Tablet 5 milliGRAM(s) Oral two times a day PRN anxiety  heparin   Injectable 6500 Unit(s) IV Push every 6 hours PRN For aPTT less than 40  heparin   Injectable 3000 Unit(s) IV Push every 6 hours PRN For aPTT between 40 - 57  melatonin 5 milliGRAM(s) Oral at bedtime PRN Insomnia      Allergies    penicillin (Unknown)    Intolerances        REVIEW OF SYSTEMS:  CONSTITUTIONAL: No fever or chills  HEENT:  No headache, no sore throat  RESPIRATORY: No cough, wheezing, or shortness of breath  CARDIOVASCULAR: No chest pain, palpitations, or leg swelling  GASTROINTESTINAL: No abd pain, nausea, vomiting, or diarrhea  GENITOURINARY: No dysuria, frequency, or hematuria  NEUROLOGICAL: no focal weakness or dizziness  MUSCULOSKELETAL: no myalgias     Vital Signs Last 24 Hrs  T(C): 36.7 (31 Jan 2022 04:57), Max: 36.7 (30 Jan 2022 21:35)  T(F): 98 (31 Jan 2022 04:57), Max: 98.1 (30 Jan 2022 21:35)  HR: 54 (31 Jan 2022 04:57) (54 - 74)  BP: 153/71 (31 Jan 2022 04:57) (136/63 - 153/71)  BP(mean): --  RR: 18 (31 Jan 2022 04:57) (18 - 18)  SpO2: 93% (31 Jan 2022 04:57) (93% - 94%)    PHYSICAL EXAM:  GENERAL: M in NAD  HEENT:  NC/AT, EOMI, improved nasal edema/hematoma  CHEST/LUNG:  CTA b/l, no rales, wheezes, or rhonchi  HEART:  RRR, S1, S2  ABDOMEN:  BS+, soft, nontender, nondistended  EXTREMITIES: no edema, cyanosis, or calf tenderness  NERVOUS SYSTEM: AA&Ox3    LABS:                        9.2    5.53  )-----------( 197      ( 31 Jan 2022 07:13 )             26.8     CBC Full  -  ( 31 Jan 2022 07:13 )  WBC Count : 5.53 K/uL  Hemoglobin : 9.2 g/dL  Hematocrit : 26.8 %  Platelet Count - Automated : 197 K/uL  Mean Cell Volume : 91.8 fl  Mean Cell Hemoglobin : 31.5 pg  Mean Cell Hemoglobin Concentration : 34.3 gm/dL  Auto Neutrophil # : 3.80 K/uL  Auto Lymphocyte # : 1.02 K/uL  Auto Monocyte # : 0.49 K/uL  Auto Eosinophil # : 0.13 K/uL  Auto Basophil # : 0.03 K/uL  Auto Neutrophil % : 68.7 %  Auto Lymphocyte % : 18.4 %  Auto Monocyte % : 8.9 %  Auto Eosinophil % : 2.4 %  Auto Basophil % : 0.5 %    31 Jan 2022 07:13    139    |  106    |  26     ----------------------------<  180    3.6     |  27     |  1.30     Ca    8.2        31 Jan 2022 07:13      PT/INR - ( 31 Jan 2022 07:13 )   PT: 18.0 sec;   INR: 1.57 ratio         PTT - ( 31 Jan 2022 07:13 )  PTT:107.4 sec    CAPILLARY BLOOD GLUCOSE      POCT Blood Glucose.: 216 mg/dL (31 Jan 2022 11:36)  POCT Blood Glucose.: 230 mg/dL (31 Jan 2022 07:42)  POCT Blood Glucose.: 304 mg/dL (30 Jan 2022 21:15)  POCT Blood Glucose.: 136 mg/dL (30 Jan 2022 16:22)        Culture - Blood (collected 01-27-22 @ 22:26)  Source: .Blood Blood-Peripheral  Preliminary Report (01-28-22 @ 23:01):    No growth to date.    Culture - Blood (collected 01-27-22 @ 22:26)  Source: .Blood Blood-Peripheral  Preliminary Report (01-28-22 @ 23:01):    No growth to date.    Culture - Abscess with Gram Stain (collected 01-27-22 @ 22:21)  Source: .Abscess right nose  Preliminary Report (01-29-22 @ 09:28):    Numerous Streptococcus pyogenes (Group A)    Penicillin and ampicillin are drugs of choice for    treatment of beta-hemolytic streptococcal infections.    Susceptibility testing is not performed routinely because    S. pyogenes (GAS) is universally susceptible to penicillin    and resistance in other strains is extremely rare.        RADIOLOGY & ADDITIONAL TESTS:    Personally reviewed.     Consultant(s) Notes Reviewed:  [x] YES  [ ] NO    Care Discussed with [x] Consultants  [x] Patient  [ ] Family  [ ]      [ ] Other; RN  DVT ppx  
Patient is a 89y old  Male who presents with a chief complaint of Nasal abscess (30 Jan 2022 11:14)      INTERVAL HPI/OVERNIGHT EVENTS: Pt seen and examined at bedside. has no complaints, feels better. had episode anxiety yesterday, resolved w/ valium.     MEDICATIONS  (STANDING):  amLODIPine   Tablet 5 milliGRAM(s) Oral daily  cefepime   IVPB 1000 milliGRAM(s) IV Intermittent every 12 hours  citalopram 20 milliGRAM(s) Oral daily  dextrose 40% Gel 15 Gram(s) Oral once  dextrose 5%. 1000 milliLiter(s) (50 mL/Hr) IV Continuous <Continuous>  dextrose 5%. 1000 milliLiter(s) (100 mL/Hr) IV Continuous <Continuous>  dextrose 50% Injectable 25 Gram(s) IV Push once  dextrose 50% Injectable 12.5 Gram(s) IV Push once  dextrose 50% Injectable 25 Gram(s) IV Push once  glucagon  Injectable 1 milliGRAM(s) IntraMuscular once  heparin  Infusion.  Unit(s)/Hr (15 mL/Hr) IV Continuous <Continuous>  hydrogen peroxide 3% Solution 1 Application(s) Topical two times a day  insulin lispro (ADMELOG) corrective regimen sliding scale   SubCutaneous Before meals and at bedtime  insulin lispro Injectable (ADMELOG) 3 Unit(s) SubCutaneous three times a day before meals  isosorbide   mononitrate ER Tablet (IMDUR) 30 milliGRAM(s) Oral daily  mupirocin 2% Nasal 1 Application(s) Nasal two times a day  ofloxacin 0.3% Solution 1 Drop(s) Both EYES two times a day  sodium chloride 0.9%. 1000 milliLiter(s) (50 mL/Hr) IV Continuous <Continuous>  vancomycin  IVPB 750 milliGRAM(s) IV Intermittent every 24 hours  warfarin 2.5 milliGRAM(s) Oral once    MEDICATIONS  (PRN):  acetaminophen     Tablet .. 650 milliGRAM(s) Oral every 6 hours PRN Temp greater or equal to 38C (100.4F), Mild Pain (1 - 3)  heparin   Injectable 6500 Unit(s) IV Push every 6 hours PRN For aPTT less than 40  heparin   Injectable 3000 Unit(s) IV Push every 6 hours PRN For aPTT between 40 - 57  melatonin 5 milliGRAM(s) Oral at bedtime PRN Insomnia      Allergies    penicillin (Unknown)    Intolerances        REVIEW OF SYSTEMS:  CONSTITUTIONAL: No fever or chills  HEENT:  No headache, no sore throat  RESPIRATORY: No cough, wheezing, or shortness of breath  CARDIOVASCULAR: No chest pain, palpitations, or leg swelling  GASTROINTESTINAL: No abd pain, nausea, vomiting, or diarrhea  GENITOURINARY: No dysuria, frequency, or hematuria  NEUROLOGICAL: no focal weakness or dizziness  MUSCULOSKELETAL: no myalgias     Vital Signs Last 24 Hrs  T(C): 36.7 (30 Jan 2022 05:07), Max: 36.7 (29 Jan 2022 21:44)  T(F): 98.1 (30 Jan 2022 05:07), Max: 98.1 (29 Jan 2022 21:44)  HR: 56 (30 Jan 2022 05:07) (56 - 80)  BP: 168/81 (30 Jan 2022 05:07) (115/55 - 168/81)  BP(mean): --  RR: 18 (30 Jan 2022 05:07) (18 - 18)  SpO2: 92% (30 Jan 2022 05:07) (92% - 96%)    PHYSICAL EXAM:  GENERAL: M in NAD  HEENT: improved hematoma w/ cracked skin nasal bridge, scant pus  CHEST/LUNG:  CTA b/l, no rales, wheezes, or rhonchi  HEART:  RRR, S1, S2  ABDOMEN:  BS+, soft, nontender, nondistended  EXTREMITIES: no edema, cyanosis, or calf tenderness  NERVOUS SYSTEM: AA&Ox3      LABS:                        10.1   4.76  )-----------( 205      ( 30 Jan 2022 08:08 )             29.3     CBC Full  -  ( 30 Jan 2022 08:08 )  WBC Count : 4.76 K/uL  Hemoglobin : 10.1 g/dL  Hematocrit : 29.3 %  Platelet Count - Automated : 205 K/uL  Mean Cell Volume : 91.8 fl  Mean Cell Hemoglobin : 31.7 pg  Mean Cell Hemoglobin Concentration : 34.5 gm/dL  Auto Neutrophil # : 3.37 K/uL  Auto Lymphocyte # : 0.77 K/uL  Auto Monocyte # : 0.43 K/uL  Auto Eosinophil # : 0.10 K/uL  Auto Basophil # : 0.04 K/uL  Auto Neutrophil % : 70.8 %  Auto Lymphocyte % : 16.2 %  Auto Monocyte % : 9.0 %  Auto Eosinophil % : 2.1 %  Auto Basophil % : 0.8 %    30 Jan 2022 08:08    138    |  105    |  29     ----------------------------<  240    3.6     |  27     |  1.30     Ca    8.5        30 Jan 2022 08:08    TPro  6.4    /  Alb  2.5    /  TBili  0.6    /  DBili  x      /  AST  18     /  ALT  24     /  AlkPhos  71     30 Jan 2022 08:08    PT/INR - ( 30 Jan 2022 08:08 )   PT: 17.3 sec;   INR: 1.51 ratio         PTT - ( 30 Jan 2022 08:08 )  PTT:78.8 sec    CAPILLARY BLOOD GLUCOSE      POCT Blood Glucose.: 255 mg/dL (30 Jan 2022 11:33)  POCT Blood Glucose.: 269 mg/dL (30 Jan 2022 08:04)  POCT Blood Glucose.: 255 mg/dL (29 Jan 2022 21:35)  POCT Blood Glucose.: 303 mg/dL (29 Jan 2022 16:13)  POCT Blood Glucose.: 303 mg/dL (29 Jan 2022 11:42)        Culture - Blood (collected 01-27-22 @ 22:26)  Source: .Blood Blood-Peripheral  Preliminary Report (01-28-22 @ 23:01):    No growth to date.    Culture - Blood (collected 01-27-22 @ 22:26)  Source: .Blood Blood-Peripheral  Preliminary Report (01-28-22 @ 23:01):    No growth to date.    Culture - Abscess with Gram Stain (collected 01-27-22 @ 22:21)  Source: .Abscess right nose  Preliminary Report (01-29-22 @ 09:28):    Numerous Streptococcus pyogenes (Group A)    Penicillin and ampicillin are drugs of choice for    treatment of beta-hemolytic streptococcal infections.    Susceptibility testing is not performed routinely because    S. pyogenes (GAS) is universally susceptible to penicillin    and resistance in other strains is extremely rare.        RADIOLOGY & ADDITIONAL TESTS:    Personally reviewed.     Consultant(s) Notes Reviewed:  [x] YES  [ ] NO    Care Discussed with [x] Consultants  [x] Patient  [ ] Family  [ ]      [ ] Other; RN  DVT ppx  
Patient is a 89y old  Male who presents with a chief complaint of Nasal abscess (28 Jan 2022 17:34)      INTERVAL HPI/OVERNIGHT EVENTS: Pt seen and examined at bedside. has no complaints. afebrile overnight.     MEDICATIONS  (STANDING):  amLODIPine   Tablet 5 milliGRAM(s) Oral daily  cefepime   IVPB 1000 milliGRAM(s) IV Intermittent every 12 hours  citalopram 20 milliGRAM(s) Oral daily  dextrose 40% Gel 15 Gram(s) Oral once  dextrose 5%. 1000 milliLiter(s) (50 mL/Hr) IV Continuous <Continuous>  dextrose 5%. 1000 milliLiter(s) (100 mL/Hr) IV Continuous <Continuous>  dextrose 50% Injectable 25 Gram(s) IV Push once  dextrose 50% Injectable 12.5 Gram(s) IV Push once  dextrose 50% Injectable 25 Gram(s) IV Push once  glucagon  Injectable 1 milliGRAM(s) IntraMuscular once  insulin lispro (ADMELOG) corrective regimen sliding scale   SubCutaneous three times a day before meals  insulin lispro (ADMELOG) corrective regimen sliding scale   SubCutaneous at bedtime  isosorbide   mononitrate ER Tablet (IMDUR) 30 milliGRAM(s) Oral daily  mupirocin 2% Nasal 1 Application(s) Nasal two times a day  potassium chloride   Powder 40 milliEquivalent(s) Oral once  sodium chloride 0.9%. 1000 milliLiter(s) (50 mL/Hr) IV Continuous <Continuous>  vancomycin  IVPB 750 milliGRAM(s) IV Intermittent every 24 hours    MEDICATIONS  (PRN):  acetaminophen     Tablet .. 650 milliGRAM(s) Oral every 6 hours PRN Temp greater or equal to 38C (100.4F), Mild Pain (1 - 3)  melatonin 5 milliGRAM(s) Oral at bedtime PRN Insomnia      Allergies    penicillin (Unknown)    Intolerances        REVIEW OF SYSTEMS:  CONSTITUTIONAL: No fever or chills  HEENT:  No headache, no sore throat  RESPIRATORY: No cough, wheezing, or shortness of breath  CARDIOVASCULAR: No chest pain, palpitations, or leg swelling  GASTROINTESTINAL: No abd pain, nausea, vomiting, or diarrhea  GENITOURINARY: No dysuria, frequency, or hematuria  NEUROLOGICAL: no focal weakness or dizziness  MUSCULOSKELETAL: no myalgias     Vital Signs Last 24 Hrs  T(C): 36.6 (29 Jan 2022 05:04), Max: 36.9 (28 Jan 2022 12:35)  T(F): 97.9 (29 Jan 2022 05:04), Max: 98.4 (28 Jan 2022 12:35)  HR: 76 (29 Jan 2022 05:04) (76 - 81)  BP: 165/84 (29 Jan 2022 05:04) (105/68 - 165/84)  BP(mean): --  RR: 18 (29 Jan 2022 05:04) (18 - 18)  SpO2: 98% (29 Jan 2022 05:04) (94% - 98%)    PHYSICAL EXAM:  GENERAL: M in NAD  HEENT: hematoma w/ cracked skin R nares, no obvious pus or drainage noted   CHEST/LUNG:  CTA b/l, no rales, wheezes, or rhonchi  HEART:  RRR, S1, S2  ABDOMEN:  BS+, soft, nontender, nondistended  EXTREMITIES: no edema, cyanosis, or calf tenderness  NERVOUS SYSTEM: AA&Ox3    LABS:                        9.9    7.55  )-----------( 198      ( 29 Jan 2022 08:20 )             28.2     CBC Full  -  ( 29 Jan 2022 08:20 )  WBC Count : 7.55 K/uL  Hemoglobin : 9.9 g/dL  Hematocrit : 28.2 %  Platelet Count - Automated : 198 K/uL  Mean Cell Volume : 92.5 fl  Mean Cell Hemoglobin : 32.5 pg  Mean Cell Hemoglobin Concentration : 35.1 gm/dL  Auto Neutrophil # : 5.90 K/uL  Auto Lymphocyte # : 0.81 K/uL  Auto Monocyte # : 0.61 K/uL  Auto Eosinophil # : 0.15 K/uL  Auto Basophil # : 0.03 K/uL  Auto Neutrophil % : 78.1 %  Auto Lymphocyte % : 10.7 %  Auto Monocyte % : 8.1 %  Auto Eosinophil % : 2.0 %  Auto Basophil % : 0.4 %    29 Jan 2022 08:20    136    |  104    |  33     ----------------------------<  241    3.4     |  26     |  1.40     Ca    8.4        29 Jan 2022 08:20    TPro  6.5    /  Alb  2.5    /  TBili  0.7    /  DBili  x      /  AST  23     /  ALT  25     /  AlkPhos  75     29 Jan 2022 08:20    PT/INR - ( 29 Jan 2022 08:20 )   PT: 21.8 sec;   INR: 1.92 ratio         PTT - ( 28 Jan 2022 06:19 )  PTT:49.9 sec    CAPILLARY BLOOD GLUCOSE      POCT Blood Glucose.: 248 mg/dL (29 Jan 2022 07:51)  POCT Blood Glucose.: 280 mg/dL (28 Jan 2022 22:08)  POCT Blood Glucose.: 229 mg/dL (28 Jan 2022 16:51)  POCT Blood Glucose.: 302 mg/dL (28 Jan 2022 12:02)        Culture - Blood (collected 01-27-22 @ 22:26)  Source: .Blood Blood-Peripheral  Preliminary Report (01-28-22 @ 23:01):    No growth to date.    Culture - Blood (collected 01-27-22 @ 22:26)  Source: .Blood Blood-Peripheral  Preliminary Report (01-28-22 @ 23:01):    No growth to date.    Culture - Abscess with Gram Stain (collected 01-27-22 @ 22:21)  Source: .Abscess right nose  Preliminary Report (01-29-22 @ 09:28):    Numerous Streptococcus pyogenes (Group A)    Penicillin and ampicillin are drugs of choice for    treatment of beta-hemolytic streptococcal infections.    Susceptibility testing is not performed routinely because    S. pyogenes (GAS) is universally susceptible to penicillin    and resistance in other strains is extremely rare.        RADIOLOGY & ADDITIONAL TESTS:    Personally reviewed.     Consultant(s) Notes Reviewed:  [x] YES  [ ] NO    Care Discussed with [x] Consultants  [x] Patient  [ ] Family  [ ]      [ ] Other; RN  DVT ppx  
Patient is a 89y old  Male who presents with a chief complaint of Possible Nasal abscess (01 Feb 2022 10:55)      INTERVAL HPI/OVERNIGHT EVENTS: Patient seen and examined at bedside. No overnight events occurred. Patient has no complaints at this time.   States he feels well, wants to go home    MEDICATIONS  (STANDING):  amLODIPine   Tablet 5 milliGRAM(s) Oral daily  cefTRIAXone   IVPB 1000 milliGRAM(s) IV Intermittent <User Schedule>  citalopram 20 milliGRAM(s) Oral daily  dextrose 40% Gel 15 Gram(s) Oral once  dextrose 5%. 1000 milliLiter(s) (50 mL/Hr) IV Continuous <Continuous>  dextrose 5%. 1000 milliLiter(s) (100 mL/Hr) IV Continuous <Continuous>  dextrose 50% Injectable 25 Gram(s) IV Push once  dextrose 50% Injectable 12.5 Gram(s) IV Push once  dextrose 50% Injectable 25 Gram(s) IV Push once  glucagon  Injectable 1 milliGRAM(s) IntraMuscular once  heparin  Infusion.  Unit(s)/Hr (15 mL/Hr) IV Continuous <Continuous>  hydrogen peroxide 3% Solution 1 Application(s) Topical two times a day  insulin lispro (ADMELOG) corrective regimen sliding scale   SubCutaneous Before meals and at bedtime  insulin lispro Injectable (ADMELOG) 3 Unit(s) SubCutaneous three times a day before meals  isosorbide   mononitrate ER Tablet (IMDUR) 30 milliGRAM(s) Oral daily  mupirocin 2% Nasal 1 Application(s) Nasal two times a day  ofloxacin 0.3% Solution 1 Drop(s) Both EYES two times a day  sodium chloride 0.9%. 1000 milliLiter(s) (50 mL/Hr) IV Continuous <Continuous>  warfarin 2.5 milliGRAM(s) Oral once    MEDICATIONS  (PRN):  acetaminophen     Tablet .. 650 milliGRAM(s) Oral every 6 hours PRN Temp greater or equal to 38C (100.4F), Mild Pain (1 - 3)  diazepam    Tablet 5 milliGRAM(s) Oral two times a day PRN anxiety  heparin   Injectable 6500 Unit(s) IV Push every 6 hours PRN For aPTT less than 40  heparin   Injectable 3000 Unit(s) IV Push every 6 hours PRN For aPTT between 40 - 57  melatonin 5 milliGRAM(s) Oral at bedtime PRN Insomnia      Allergies    penicillin (Rash)    Intolerances        REVIEW OF SYSTEMS:  CONSTITUTIONAL: No fever or chills  HEENT:  No headache, no sore throat  RESPIRATORY: No cough, wheezing, or shortness of breath  CARDIOVASCULAR: No chest pain, palpitations  GASTROINTESTINAL: No abd pain, nausea, vomiting, or diarrhea  GENITOURINARY: No dysuria, frequency, or hematuria  NEUROLOGICAL: no focal weakness or dizziness  MUSCULOSKELETAL: no myalgias     Vital Signs Last 24 Hrs  T(C): 36.8 (01 Feb 2022 05:10), Max: 36.8 (01 Feb 2022 05:10)  T(F): 98.2 (01 Feb 2022 05:10), Max: 98.2 (01 Feb 2022 05:10)  HR: 70 (01 Feb 2022 05:10) (70 - 81)  BP: 152/68 (01 Feb 2022 05:10) (122/75 - 152/68)  BP(mean): --  RR: 18 (01 Feb 2022 05:10) (18 - 18)  SpO2: 95% (01 Feb 2022 05:10) (95% - 97%)    PHYSICAL EXAM:  GENERAL: NAD  HEENT:  anicteric, moist mucous membranes  CHEST/LUNG:  CTA b/l, no rales, wheezes, or rhonchi  HEART:  RRR, S1, S2  ABDOMEN:  BS+, soft, nontender, nondistended  EXTREMITIES: no edema, cyanosis, or calf tenderness  SKIN: nasal area with dry scab, faint erythema  NERVOUS SYSTEM: answers questions and follows commands appropriately    LABS:                        9.1    6.87  )-----------( 191      ( 01 Feb 2022 07:48 )             26.9     CBC Full  -  ( 01 Feb 2022 07:48 )  WBC Count : 6.87 K/uL  Hemoglobin : 9.1 g/dL  Hematocrit : 26.9 %  Platelet Count - Automated : 191 K/uL  Mean Cell Volume : 94.1 fl  Mean Cell Hemoglobin : 31.8 pg  Mean Cell Hemoglobin Concentration : 33.8 gm/dL  Auto Neutrophil # : 5.30 K/uL  Auto Lymphocyte # : 0.92 K/uL  Auto Monocyte # : 0.50 K/uL  Auto Eosinophil # : 0.07 K/uL  Auto Basophil # : 0.03 K/uL  Auto Neutrophil % : 77.2 %  Auto Lymphocyte % : 13.4 %  Auto Monocyte % : 7.3 %  Auto Eosinophil % : 1.0 %  Auto Basophil % : 0.4 %    01 Feb 2022 07:48    138    |  106    |  25     ----------------------------<  199    3.9     |  27     |  1.30     Ca    8.3        01 Feb 2022 07:48      PT/INR - ( 01 Feb 2022 07:48 )   PT: 19.5 sec;   INR: 1.71 ratio         PTT - ( 01 Feb 2022 07:48 )  PTT:88.0 sec    CAPILLARY BLOOD GLUCOSE      POCT Blood Glucose.: 230 mg/dL (01 Feb 2022 07:34)  POCT Blood Glucose.: 253 mg/dL (31 Jan 2022 21:15)  POCT Blood Glucose.: 249 mg/dL (31 Jan 2022 16:43)        Culture - Blood (collected 01-27-22 @ 22:26)  Source: .Blood Blood-Peripheral  Preliminary Report (01-28-22 @ 23:01):    No growth to date.    Culture - Blood (collected 01-27-22 @ 22:26)  Source: .Blood Blood-Peripheral  Preliminary Report (01-28-22 @ 23:01):    No growth to date.    Culture - Abscess with Gram Stain (collected 01-27-22 @ 22:21)  Source: .Abscess right nose  Preliminary Report (01-29-22 @ 09:28):    Numerous Streptococcus pyogenes (Group A)    Penicillin and ampicillin are drugs of choice for    treatment of beta-hemolytic streptococcal infections.    Susceptibility testing is not performed routinely because    S. pyogenes (GAS) is universally susceptible to penicillin    and resistance in other strains is extremely rare.        RADIOLOGY & ADDITIONAL TESTS:    Personally reviewed.     Consultant(s) Notes Reviewed:  [x] YES  [ ] NO    
Patient is a 89y old  Male who presents with a chief complaint of Nasal abscess (28 Jan 2022 13:20)      INTERVAL HPI/OVERNIGHT EVENTS: Pt seen and examined at bedside. has no complaints. denies fevers/chills, although Temp 100.8 noted overnight. admits mild blurred vision.     MEDICATIONS  (STANDING):  amLODIPine   Tablet 5 milliGRAM(s) Oral daily  cefepime   IVPB 1000 milliGRAM(s) IV Intermittent every 12 hours  citalopram 20 milliGRAM(s) Oral daily  dextrose 40% Gel 15 Gram(s) Oral once  dextrose 5%. 1000 milliLiter(s) (50 mL/Hr) IV Continuous <Continuous>  dextrose 5%. 1000 milliLiter(s) (100 mL/Hr) IV Continuous <Continuous>  dextrose 50% Injectable 25 Gram(s) IV Push once  dextrose 50% Injectable 12.5 Gram(s) IV Push once  dextrose 50% Injectable 25 Gram(s) IV Push once  glucagon  Injectable 1 milliGRAM(s) IntraMuscular once  insulin lispro (ADMELOG) corrective regimen sliding scale   SubCutaneous every 6 hours  isosorbide   mononitrate ER Tablet (IMDUR) 30 milliGRAM(s) Oral daily  phytonadione   Solution 5 milliGRAM(s) Oral once  vancomycin  IVPB 750 milliGRAM(s) IV Intermittent every 24 hours    MEDICATIONS  (PRN):  acetaminophen     Tablet .. 650 milliGRAM(s) Oral every 6 hours PRN Temp greater or equal to 38C (100.4F), Mild Pain (1 - 3)  melatonin 5 milliGRAM(s) Oral at bedtime PRN Insomnia      Allergies    penicillin (Unknown)    Intolerances        REVIEW OF SYSTEMS:  CONSTITUTIONAL: No fever or chills  HEENT: see hpi  RESPIRATORY: No cough, wheezing, or shortness of breath  CARDIOVASCULAR: No chest pain, palpitations, or leg swelling  GASTROINTESTINAL: No abd pain, nausea, vomiting, or diarrhea  GENITOURINARY: No dysuria, frequency, or hematuria  NEUROLOGICAL: no focal weakness or dizziness  MUSCULOSKELETAL: no myalgias     Vital Signs Last 24 Hrs  T(C): 36.9 (28 Jan 2022 12:35), Max: 38.2 (27 Jan 2022 21:08)  T(F): 98.4 (28 Jan 2022 12:35), Max: 100.8 (27 Jan 2022 21:08)  HR: 79 (28 Jan 2022 12:35) (68 - 84)  BP: 115/71 (28 Jan 2022 12:35) (115/71 - 149/78)  BP(mean): --  RR: 18 (28 Jan 2022 12:35) (16 - 18)  SpO2: 95% (28 Jan 2022 12:35) (95% - 98%)    PHYSICAL EXAM:  GENERAL: M in NAD  HEENT: hematoma w/ cracked skin R nares, no obvious pus or drainage noted   CHEST/LUNG:  CTA b/l, no rales, wheezes, or rhonchi  HEART:  RRR, S1, S2  ABDOMEN:  BS+, soft, nontender, nondistended  EXTREMITIES: no edema, cyanosis, or calf tenderness  NERVOUS SYSTEM: AA&Ox3    LABS:                        11.0   12.03 )-----------( 194      ( 28 Jan 2022 06:19 )             31.2     CBC Full  -  ( 28 Jan 2022 06:19 )  WBC Count : 12.03 K/uL  Hemoglobin : 11.0 g/dL  Hematocrit : 31.2 %  Platelet Count - Automated : 194 K/uL  Mean Cell Volume : 92.3 fl  Mean Cell Hemoglobin : 32.5 pg  Mean Cell Hemoglobin Concentration : 35.3 gm/dL  Auto Neutrophil # : 10.49 K/uL  Auto Lymphocyte # : 0.51 K/uL  Auto Monocyte # : 0.81 K/uL  Auto Eosinophil # : 0.09 K/uL  Auto Basophil # : 0.04 K/uL  Auto Neutrophil % : 87.4 %  Auto Lymphocyte % : 4.2 %  Auto Monocyte % : 6.7 %  Auto Eosinophil % : 0.7 %  Auto Basophil % : 0.3 %    28 Jan 2022 06:19    138    |  103    |  28     ----------------------------<  248    3.7     |  26     |  1.20     Ca    8.5        28 Jan 2022 06:19    TPro  6.6    /  Alb  2.5    /  TBili  0.6    /  DBili  x      /  AST  12     /  ALT  17     /  AlkPhos  75     28 Jan 2022 06:19    PT/INR - ( 28 Jan 2022 06:19 )   PT: 69.2 sec;   INR: 6.46 ratio         PTT - ( 28 Jan 2022 06:19 )  PTT:49.9 sec    CAPILLARY BLOOD GLUCOSE      POCT Blood Glucose.: 302 mg/dL (28 Jan 2022 12:02)  POCT Blood Glucose.: 226 mg/dL (28 Jan 2022 05:33)  POCT Blood Glucose.: 336 mg/dL (27 Jan 2022 21:28)          RADIOLOGY & ADDITIONAL TESTS:    Personally reviewed.     Consultant(s) Notes Reviewed:  [x] YES  [ ] NO    Care Discussed with [x] Consultants  [x] Patient  [ ] Family  [ ]      [ ] Other; RN  DVT ppx

## 2022-02-02 NOTE — DISCHARGE NOTE PROVIDER - CARE PROVIDER_API CALL
Mohsen Hoang  INTERNAL MEDICINE  [address], [facility] [address]  [place] [address]  Phone: [phone]  Fax: [phone]  Follow Up Time: 1 week    Rober Boyd)  Infectious Disease; Internal Medicine  [address], [address]  [place] [address]  Phone: [phone]  Fax: [phone]  Follow Up Time: 1 week

## 2022-02-02 NOTE — PROGRESS NOTE ADULT - ATTENDING COMMENTS
Chart reviewed    Patient seen and examined    Agree with plan as outlined    89yM with PMH of HTN, DM2, depression and hx of AVR/MVR, Afib (on warfarin), admitted for nasal abscess requiring IV antibiotics, and nasal fracture. Cardiology consulted for pre operative clearance.     Patient has PPM placed in 2019 for SSS, last interrogated August 2021.  [facility]so life: 9.3-11.2 yrs.  Report on chart.  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   Cardiac Cath preformed 2/2 to chronic chest pain, medical therapy recommended at the time     Supratherapeutic INR/BioMVR/AVR/Afib/Cardiac Optimization  - s/p Vit K 5 mg PO x 1 on admission for INR of 5.53 -->6.46.    - ENT following no surgical intervention necessary at this time, cont abx per ID  - Today's INR = 1.8  Continue Heparin gtt daily with bridge,  dosing of Coumadin  INR 2.0-3   - Monitor H/H, stable  - EKG: no signs of plaque rupture; Vpaced.  Tele showing Vpaced rhythm with underlying NSR nw tele d/c'd  - Monitor electrolytes, replete to keep K>4 and Mag>2  - No evidence of ischemia, significant cardiac murmur, cardiac arrhythmia or volume overload.  - Cardiac records from Dr. Chung on chart.  PPM interrogation done 11/2021: Adelso life 9.3-11.2 yrs.  Record on chart  - Stable from cardiac standpoint.  Will follow up with his private Cardiologist
Dose Coumadin.  Stable CV POV.  To follow closely while admitted.
No signs of significant ischemia or volume overload. dose ac. Further cardiac workup will depend on clinical course.
Chart reviewed    Patient seen and examined    Agree with plan as outlined above    89yM with PMH of HTN, DM2, depression and hx of AVR/MVR, Afib (on warfarin), admitted for nasal abscess requiring IV antibiotics, and nasal fracture. Cardiology consulted for pre operative clearance.     Patient has PPM placed in 2019 for SSS, last interrogated August 2021.  Adelso life: 9.3-11.2 yrs.  Report on chart.  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   Cardiac Cath preformed 2/2 to chronic chest pain, medical therapy recommended at the time     Supratherapeutic INR/BioMVR/AVR/Afib/Cardiac Optimization  - s/p Vit K 5 mg PO x 1 for INR of 5.53 -->6.46.    - Today's INR = 1.51.  Continue Heparin gtt.  Can hold temporarily as deemed necessary prior to nasal abscess I & D.  Resume dosing Coumadin postop once cleared by ENT to keep INR 2-3  - EKG: no signs of plaque rupture; Vpaced.  Tele showing Vpaced rhythm with underlying NSR.  Can D/C monitor   - Monitor electrolytes, replete to keep K>4 and Mag>2  - No evidence of ischemia, significant cardiac murmur, cardiac arrhythmia or volume overload.  - He is considered an intermediate risk for an intermediate risk procedure, but may proceed with planned procedure. He has no modifiable risk factors at this time.  However, per ENT, no procedure needed and for possible D/C in am    - Cardiac records from Dr. Chung on chart.  PPM interrogation done 11/2021: Adelso life 9.3-11.2 yrs.  Record on chart  - Stable from cardiac standpoint.  Will follow up with his private Cardiologist  Will continue to follow while admitted
Chart reviewed    Patient seen and examined    Agree with plan as outlined above    89yM with PMH of HTN, DM2, depression and hx of AVR/MVR, Afib (on warfarin), admitted for nasal abscess requiring IV antibiotics, and nasal fracture. Cardiology consulted for pre operative clearance.     Patient has PPM placed in 2019 for SSS, last interrogated August 2021.  Adelso life: 9.3-11.2 yrs.  Report on chart.  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   Cardiac Cath preformed 2/2 to chronic chest pain, medical therapy recommended at the time     Supratherapeutic INR/? Mechanical MVR/AVR/Afib/Cardiac Optimization  - s/p Vit K 5 mg PO x 1 for INR of 5.53 -->6.46.    - Today's INR = 1.98.  Continue Heparin gtt.  Can hold temporarily as deemed necessary prior to nasal abscess I & D.  Resume dosing Coumadin postop once cleared by ENT  - EKG reveals no signs of plaque rupture; Vpaced.    - Monitor electrolytes, replete to keep K>4 and Mag>2  - No evidence of ischemia, significant cardiac murmur, cardiac arrhythmia or volume overload.  - He is considered an intermediate risk for an intermediate risk procedure, but may proceed with planned procedure. He has no modifiable risk factors at this time  - Cardiac records from Dr. Raygoza on chart.

## 2022-02-18 ENCOUNTER — EMERGENCY (EMERGENCY)
Facility: HOSPITAL | Age: 87
LOS: 1 days | Discharge: HOME | End: 2022-02-18
Attending: EMERGENCY MEDICINE | Admitting: EMERGENCY MEDICINE
Payer: MEDICARE

## 2022-02-18 VITALS
SYSTOLIC BLOOD PRESSURE: 165 MMHG | RESPIRATION RATE: 18 BRPM | DIASTOLIC BLOOD PRESSURE: 96 MMHG | TEMPERATURE: 98 F | HEART RATE: 99 BPM | OXYGEN SATURATION: 98 %

## 2022-02-18 VITALS
RESPIRATION RATE: 18 BRPM | SYSTOLIC BLOOD PRESSURE: 173 MMHG | WEIGHT: 184.97 LBS | HEIGHT: 75 IN | OXYGEN SATURATION: 99 % | HEART RATE: 103 BPM | DIASTOLIC BLOOD PRESSURE: 98 MMHG | TEMPERATURE: 98 F

## 2022-02-18 LAB
INR BLD: 1.84 RATIO — HIGH (ref 0.88–1.16)
PROTHROM AB SERPL-ACNC: 20.9 SEC — HIGH (ref 10.6–13.6)

## 2022-02-18 PROCEDURE — 36415 COLL VENOUS BLD VENIPUNCTURE: CPT

## 2022-02-18 PROCEDURE — 85610 PROTHROMBIN TIME: CPT

## 2022-02-18 PROCEDURE — 99283 EMERGENCY DEPT VISIT LOW MDM: CPT

## 2022-02-18 PROCEDURE — 99284 EMERGENCY DEPT VISIT MOD MDM: CPT

## 2022-02-18 RX ORDER — TRANEXAMIC ACID 100 MG/ML
5 INJECTION, SOLUTION INTRAVENOUS ONCE
Refills: 0 | Status: COMPLETED | OUTPATIENT
Start: 2022-02-18 | End: 2022-02-18

## 2022-02-18 RX ADMIN — TRANEXAMIC ACID 5 MILLILITER(S): 100 INJECTION, SOLUTION INTRAVENOUS at 21:19

## 2022-02-18 NOTE — ED PROVIDER NOTE - OBJECTIVE STATEMENT
89 M on coumadin c/o epistaxis. Reports nasal injury January and since then has been having intermittent nose bleeds. Follows with Dr. Adriano Bowling ENT (last visit a few days ago). Denies additional trauma.

## 2022-02-18 NOTE — ED PROVIDER NOTE - PROGRESS NOTE DETAILS
No further bleeding. INR 1.84. ON 2/14 was 2.08. Has appt with PMD in 3 days, advised to discuss and recheck.   Discussed the importance of prompt, close medical follow-up.  Patient will return with any changes, concerns or persistent/worsening symptoms.  Understanding of all instructions verbalized.

## 2022-02-18 NOTE — ED PROVIDER NOTE - PATIENT PORTAL LINK FT
You can access the FollowMyHealth Patient Portal offered by [facility] by registering at the following website: [email or web address]. By joining [facility]’s FollowMyHealth portal, you will also be able to view your health information using other applications (apps) compatible with our system.

## 2022-02-18 NOTE — ED PROVIDER NOTE - NSFOLLOWUPINSTRUCTIONS_ED_ALL_ED_FT
Follow up with PMD Monday as planned. Let them know about INR level.  Follow up with ENT as planned.  Return to the Emergency Department for worsening or concerning symptoms.    ------------------------------------------------------    Nosebleed    WHAT YOU NEED TO KNOW:    A nosebleed, or epistaxis, occurs when one or more of the blood vessels in your nose break. You may have dark or bright red blood from one or both nostrils. A nosebleed is most commonly caused by dry air or picking your nose. A direct blow to your nose, irritation from a cold or allergies, or a foreign object can also cause a nosebleed.     DISCHARGE INSTRUCTIONS:    Return to the emergency department if:   •Your nasal packing is soaked with blood.      •Your nose is still bleeding after 20 minutes, even after you pinch it.       •You have a foul-smelling discharge coming out of your nose.      •You feel so weak and dizzy that you have trouble standing up.      •You have trouble breathing or talking.       Contact your healthcare provider if:   •You have a fever and are vomiting.      •You have pain in and around your nose that is getting worse even after you take pain medicines.      •Your nasal pack is loose.      •You have questions or concerns about your condition or care.      First aid:   •Sit up and lean forward. This will help prevent you from swallowing blood. Spit blood and saliva into a bowl.       •Apply pressure to your nose. Use 2 fingers to pinch your nose shut for 10       •Apply ice on the bridge of your nose to decrease swelling and bleeding. Use a cold pack or put crushed ice in a plastic bag. Cover it with a towel to protect your skin.      •Pack your nose with a cotton ball, tissue, tampon, or gauze bandage to stop the bleeding.      Medicines:   •Medicines applied to a small piece of cotton and placed in your nose. Medicine may also be sprayed in or applied directly to your nose. You may need medicine to prevent an infection. If bleeding is severe, medicine may be injected into a blood vessel in your nose.       •Take your medicine as directed. Contact your healthcare provider if you think your medicine is not helping or if you have side effects. Tell him of her if you are allergic to any medicine. Keep a list of the medicines, vitamins, and herbs you take. Include the amounts, and when and why you take them. Bring the list or the pill bottles to follow-up visits. Carry your medicine list with you in case of an emergency.      Prevent another nosebleed:   •Keep your nose moist. Put a small amount of petroleum jelly inside your nostrils as needed. Use a saline (saltwater) nasal spray. Do not put anything else inside your nose unless your healthcare provider says it is okay. Do not use oil-based lubricants if you use oxygen therapy. They may be flammable.      •Use a cool mist humidifier to increase air moisture in your home. This will help your nose stay moist.       •Do not pick or blow your nose for at least a week. You can irritate or damage your nose if you pick it. Blowing your nose too hard may cause the bleeding to start again. Do not bend over or strain as this can cause the bleeding to start again.      •Avoid irritants such as tobacco smoke or chemical sprays such as .      Follow up with your healthcare provider as directed: Any packing in your nose should be removed within 2 to 3 days. Write down your questions so you remember to ask them during your visits.

## 2022-02-18 NOTE — ED PROVIDER NOTE - ATTENDING CONTRIBUTION TO CARE
89 y/o M with c/o recurrent epistaxis on coumadin.  pt was seen in the urgent care tonight and told to go to the ER due to being coumadin    Well appearing no distress  left nare active ant bleeding    TXA, check INR

## 2022-02-18 NOTE — ED ADULT NURSE NOTE - OBJECTIVE STATEMENT
Pt is S/P fall mid January, suffered nasal Fx & laceration to bridge of nose, which is now partially  healed, pt is on coumadin and today left nare began to bleed, pt arrived with holding nose with gauze which had a small amount of bright red blood, AxOx4, spouse at bedside, safety maintained will continue to monitor

## 2022-02-19 PROBLEM — I10 ESSENTIAL (PRIMARY) HYPERTENSION: Chronic | Status: ACTIVE | Noted: 2022-01-27

## 2022-02-19 PROBLEM — F32.9 MAJOR DEPRESSIVE DISORDER, SINGLE EPISODE, UNSPECIFIED: Chronic | Status: ACTIVE | Noted: 2022-01-27

## 2022-02-19 PROBLEM — E11.9 TYPE 2 DIABETES MELLITUS WITHOUT COMPLICATIONS: Chronic | Status: ACTIVE | Noted: 2022-01-27

## 2022-02-19 PROBLEM — G47.00 INSOMNIA, UNSPECIFIED: Chronic | Status: ACTIVE | Noted: 2022-01-27

## 2022-05-21 ENCOUNTER — EMERGENCY (EMERGENCY)
Facility: HOSPITAL | Age: 87
LOS: 1 days | Discharge: HOME | End: 2022-05-21
Attending: EMERGENCY MEDICINE | Admitting: EMERGENCY MEDICINE
Payer: MEDICARE

## 2022-05-21 VITALS
DIASTOLIC BLOOD PRESSURE: 70 MMHG | OXYGEN SATURATION: 97 % | WEIGHT: 184.97 LBS | HEIGHT: 75 IN | SYSTOLIC BLOOD PRESSURE: 131 MMHG | TEMPERATURE: 98 F | HEART RATE: 66 BPM | RESPIRATION RATE: 18 BRPM

## 2022-05-21 VITALS
DIASTOLIC BLOOD PRESSURE: 68 MMHG | TEMPERATURE: 98 F | OXYGEN SATURATION: 98 % | RESPIRATION RATE: 16 BRPM | HEART RATE: 69 BPM | SYSTOLIC BLOOD PRESSURE: 125 MMHG

## 2022-05-21 PROCEDURE — 99283 EMERGENCY DEPT VISIT LOW MDM: CPT

## 2022-05-21 RX ADMIN — Medication 1 ENEMA: at 00:57

## 2022-05-21 NOTE — ED PROVIDER NOTE - NSFOLLOWUPINSTRUCTIONS_ED_ALL_ED_FT
[facility]                                                                                                           Constipation, Adult      Constipation is when a person has fewer than three bowel movements in a week, has difficulty having a bowel movement, or has stools (feces) that are dry, hard, or larger than normal. Constipation may be caused by an underlying condition. It may become worse with age if a person takes certain medicines and does not take in enough fluids.      Follow these instructions at home:      Eating and drinking      •Eat foods that have a lot of fiber, such as beans, whole grains, and fresh fruits and vegetables.      •Limit foods that are low in fiber and high in fat and processed sugars, such as fried or sweet foods. These include french fries, hamburgers, cookies, candies, and soda.      •Drink enough fluid to keep your urine pale yellow.      General instructions     •Exercise regularly or as told by your health care provider. Try to do 150 minutes of moderate exercise each week.      •Use the bathroom when you have the urge to go. Do not hold it in.      •Take over-the-counter and prescription medicines only as told by your health care provider. This includes any fiber supplements.    •During bowel movements:   •Practice deep breathing while relaxing the lower abdomen.      •Practice pelvic floor relaxation.        •Watch your condition for any changes. Let your health care provider know about them.      •Keep all follow-up visits as told by your health care provider. This is important.        Contact a health care provider if:    •You have pain that gets worse.      •You have a fever.      •You do not have a bowel movement after 4 days.      •You vomit.      •You are not hungry or you lose weight.      •You are bleeding from the opening between the buttocks (anus).      •You have thin, pencil-like stools.        Get help right away if:    •You have a fever and your symptoms suddenly get worse.      •You leak stool or have blood in your stool.      •Your abdomen is bloated.      •You have severe pain in your abdomen.      •You feel dizzy or you faint.        Summary    •Constipation is when a person has fewer than three bowel movements in a week, has difficulty having a bowel movement, or has stools (feces) that are dry, hard, or larger than normal.      •Eat foods that have a lot of fiber, such as beans, whole grains, and fresh fruits and vegetables.      •Drink enough fluid to keep your urine pale yellow.      •Take over-the-counter and prescription medicines only as told by your health care provider. This includes any fiber supplements.      This information is not intended to replace advice given to you by your health care provider. Make sure you discuss any questions you have with your health care provider.

## 2022-05-21 NOTE — ED ADULT NURSE NOTE - OBJECTIVE STATEMENT
89 y/o male received ambulatory from triage. Alert and oriented x4. C/o constipation. Abdomen soft, non tender, non distended. Pt denies any cp, sob, n/v/d, dizziness, fever/chills. Respirations even and unlabored. No acute distress noted at this time. MD at bedside. Will continue to monitor.

## 2022-05-21 NOTE — ED ADULT TRIAGE NOTE - CHIEF COMPLAINT QUOTE
Pt sts that after dinner began having rectal pain, also feels urgency to have bowel movement but nothing comes out.

## 2022-05-21 NOTE — ED ADULT NURSE NOTE - NSIMPLEMENTINTERV_GEN_ALL_ED
Implemented All Fall with Harm Risk Interventions:  [place] to call system. Call bell, personal items and telephone within reach. Instruct patient to call for assistance. Room bathroom lighting operational. Non-slip footwear when patient is off stretcher. Physically safe environment: no spills, clutter or unnecessary equipment. Stretcher in lowest position, wheels locked, appropriate side rails in place. Provide visual cue, wrist band, yellow gown, etc. Monitor gait and stability. Monitor for mental status changes and reorient to person, place, and time. Review medications for side effects contributing to fall risk. Reinforce activity limits and safety measures with patient and family. Provide visual clues: red socks.

## 2022-05-21 NOTE — ED PROVIDER NOTE - PATIENT PORTAL LINK FT
You can access the FollowMyHealth Patient Portal offered by [facility] by registering at the following website: [email or web address]. By joining [employer]’s FollowMyHealth portal, you will also be able to view your health information using other applications (apps) compatible with our system.

## 2022-05-21 NOTE — ED PROVIDER NOTE - OBJECTIVE STATEMENT
89yo male with constipation for the last few days, pt has been unable to go, tried dulcolax and suppository with no relief, no abd pain no vomiting no other complaints

## 2022-10-01 ENCOUNTER — EMERGENCY (EMERGENCY)
Facility: HOSPITAL | Age: 87
LOS: 1 days | Discharge: HOME | End: 2022-10-01
Attending: EMERGENCY MEDICINE | Admitting: EMERGENCY MEDICINE
Payer: MEDICARE

## 2022-10-01 VITALS
SYSTOLIC BLOOD PRESSURE: 158 MMHG | WEIGHT: 184.97 LBS | OXYGEN SATURATION: 98 % | RESPIRATION RATE: 16 BRPM | HEIGHT: 72 IN | TEMPERATURE: 98 F | DIASTOLIC BLOOD PRESSURE: 86 MMHG | HEART RATE: 59 BPM

## 2022-10-01 PROCEDURE — 99283 EMERGENCY DEPT VISIT LOW MDM: CPT

## 2022-10-01 NOTE — ED PROVIDER NOTE - PHYSICAL EXAMINATION
Gen: Alert, NAD  Head/eyes: NC/AT, PERRL  ENT: airway patent  Neck: supple  Pulm/lung: Bilateral clear BS  CV/heart: RRR  GI/Abd: soft, NT/ND, +BS, no guarding/rebound tenderness, rectal exam: +stool in vault  Musculoskeletal: no edema/erythema/cyanosis  Skin: no rash  Neuro: AAOx3, grossly intact

## 2022-10-01 NOTE — ED ADULT TRIAGE NOTE - ACCOMPANIED BY
Problem: Stroke: Hemorrhagic  Goal: Neurological status is maintained/returned to baseline  Description: Monitor neurological and mental status including symptoms of increasing intercranial pressure (headache, nausea/vomiting, or change in behavior).  Hypertension (>180 systolic) may also indicate a change in status related to stroke.  Outcome: Outcome Met, Continue evaluating goal progress toward completion  Goal: Normal temperature is maintained  Outcome: Outcome Met, Continue evaluating goal progress toward completion  Goal: Elimination status is maintained/returned to baseline  Description: Remove indwelling urinary catheter as soon as possible or collaborate with provider for order/reason for continued use.   Outcome: Outcome Met, Continue evaluating goal progress toward completion     Problem: At Risk for Falls  Goal: # Patient does not fall  Outcome: Outcome Met, Continue evaluating goal progress toward completion     Problem: At Risk for Injury Due to Fall  Goal: # Patient does not fall  Outcome: Outcome Met, Continue evaluating goal progress toward completion     Problem: Stroke: Hemorrhagic  Goal: #Depressive s/s (self-reported/observed) are recognized and monitored  Outcome: Outcome Not Met, Continue to Monitor  Goal: Personal stroke risk factors are identified with initial plan for risk reduction  Description: Stroke risk reduction involves taking medication, changing diet, increasing physical exercise, smoking cessation, or alcohol/drug use reduction/cessation based on identified risks.  Outcome: Outcome Not Met, Continue to Monitor  Goal: Verbalizes understanding of condition and treatment plan  Description: Document education using the patient education activity.  Outcome: Outcome Not Met, Continue to Monitor     Problem: At Risk for Falls  Goal: # Takes action to control fall-related risks  Outcome: Outcome Not Met, Continue to Monitor  Goal: # Verbalizes understanding of fall  risk/precautions  Description: Document education using the patient education activity  Outcome: Outcome Not Met, Continue to Monitor     Problem: At Risk for Injury Due to Fall  Goal: # Takes action to control condition specific risks  Outcome: Outcome Not Met, Continue to Monitor  Goal: # Verbalizes understanding of fall-related injury personal risks  Description: Document education using the patient education activity  Outcome: Outcome Not Met, Continue to Monitor      Spouse/Significant other

## 2022-10-01 NOTE — ED PROVIDER NOTE - OBJECTIVE STATEMENT
89-year-old male history of hypertension diabetes depression insomnia constipation complaining about being constipated for 4 days not having a bowel movement with rectal pressure and pain.  Wife gave patient stool softeners without much relief.  Denies any nausea or vomiting.  No fever no chills.  Denies any abdominal pain.

## 2022-10-01 NOTE — ED PROVIDER NOTE - NSFOLLOWUPINSTRUCTIONS_ED_ALL_ED_FT
1) Follow-up with your Primary Medical Doctor or referred doctor. Call today / next business day for prompt follow-up.  2) Return to Emergency room for any worsening or persistent pain, weakness, fever, or any other concerning symptoms.  3) See attached instruction sheets for additional information, including information regarding signs and symptoms to look out for, reasons to seek immediate care and other important instructions.  4) Follow-up with any specialists as discussed / noted as well.       WHAT YOU NEED TO KNOW:    Constipation means you have hard, dry bowel movements, or you go longer than usual between bowel movements.    DISCHARGE INSTRUCTIONS:    Call your doctor if:   •You have blood in your bowel movements.      •You have a fever and abdominal pain with the constipation.      •Your constipation gets worse.      •You start to vomit.      •You have questions or concerns about your condition or care.      Medicines:   •Medicine such as a laxative may help relax and loosen your intestines to help you have a bowel movement. Your provider may recommend you only use laxatives for a short time. Long-term use may make your bowels dependent on the medicine.      •Take your medicine as directed. Contact your healthcare provider if you think your medicine is not helping or if you have side effects. Tell your provider if you are allergic to any medicine. Keep a list of the medicines, vitamins, and herbs you take. Include the amounts, and when and why you take them. Bring the list or the pill bottles to follow-up visits. Carry your medicine list with you in case of an emergency.      Relieve constipation:   •A suppository may be used to help soften your bowel movements. This may make them easier to pass. A suppository is guided into your rectum through your anus.  Suppository for Constipation           •An enema is liquid medicine used to clear bowel movement from your rectum. The medicine is put into your rectum through your anus.  Enemas           Prevent constipation:   •Drink liquids as directed. You may need to drink extra liquids to help soften and move your bowels. Ask how much liquid to drink each day and which liquids are best for you.      •Eat high-fiber foods. This may help decrease constipation by adding bulk to your bowel movements. High-fiber foods include fruit, vegetables, whole-grain breads and cereals, and beans. Your healthcare provider or dietitian can help you create a high-fiber meal plan. Your provider may also recommend a fiber supplement if you cannot get enough fiber from food.             •Exercise regularly. Regular physical activity can help stimulate your intestines. Walking is a good exercise to prevent or relieve constipation. Ask which exercises are best for you.  [employer] Walking for Exercise           •Schedule a time each day to have a bowel movement. This may help train your body to have regular bowel movements. Bend forward while you are on the toilet to help move the bowel movement out. Sit on the toilet for at least 10 minutes, even if you do not have a bowel movement.      •Talk to your healthcare provider about your medicines. Certain medicines, such as opioids, can cause constipation. Your provider may be able to make medicine changes. For example, he or she may change the kind of medicine, or change when you take it.      Follow up with your doctor as directed: Write down your questions so you remember to ask them during your visits.

## 2022-10-01 NOTE — ED PROVIDER NOTE - PATIENT PORTAL LINK FT
You can access the FollowMyHealth Patient Portal offered by [facility] by registering at the following website: [email or web address]. By joining [employer]’s FollowMyHealth portal, you will also be able to view your health information using other applications (apps) compatible with our system.

## 2022-10-01 NOTE — ED ADULT NURSE NOTE - OBJECTIVE STATEMENT
Stated he was unable to have a bowel movement in 4 days. States he has history of constipation. Complaining of abdominal discomfort 5/10 nonradiating. No changes in dietary pattern

## 2022-10-01 NOTE — ED PROVIDER NOTE - NS ED ROS FT

## 2022-10-01 NOTE — ED ADULT TRIAGE NOTE - HEIGHT IN CM
182.88

## 2022-10-01 NOTE — ED PROVIDER NOTE - PROGRESS NOTE DETAILS
manual disimpaction performed, large amount of stool removed, enema ordered pt feels much better, had large BM

## 2022-10-02 VITALS
RESPIRATION RATE: 16 BRPM | OXYGEN SATURATION: 96 % | SYSTOLIC BLOOD PRESSURE: 158 MMHG | TEMPERATURE: 98 F | DIASTOLIC BLOOD PRESSURE: 83 MMHG | HEART RATE: 58 BPM

## 2022-10-02 NOTE — ED ADULT NURSE REASSESSMENT NOTE - NS ED NURSE REASSESS COMMENT FT1
Patient was dis impacted by Dr. Alex. Enema ordered to be followed up. Pt tolerated.
Enema administered> pt had large BM. Stated he feels better

## 2022-10-02 NOTE — ED ADULT NURSE REASSESSMENT NOTE - NSIMPLEMENTINTERV_GEN_ALL_ED
Implemented All Fall Risk Interventions:  [place] to call system. Call bell, personal items and telephone within reach. Instruct patient to call for assistance. Room bathroom lighting operational. Non-slip footwear when patient is off stretcher. Physically safe environment: no spills, clutter or unnecessary equipment. Stretcher in lowest position, wheels locked, appropriate side rails in place. Provide visual cue, wrist band, yellow gown, etc. Monitor gait and stability. Monitor for mental status changes and reorient to person, place, and time. Review medications for side effects contributing to fall risk. Reinforce activity limits and safety measures with patient and family.

## 2022-10-16 ENCOUNTER — EMERGENCY (EMERGENCY)
Facility: HOSPITAL | Age: 87
LOS: 1 days | Discharge: HOME | End: 2022-10-16
Attending: EMERGENCY MEDICINE | Admitting: EMERGENCY MEDICINE
Payer: MEDICARE

## 2022-10-16 VITALS
SYSTOLIC BLOOD PRESSURE: 119 MMHG | DIASTOLIC BLOOD PRESSURE: 72 MMHG | TEMPERATURE: 98 F | WEIGHT: 184.97 LBS | RESPIRATION RATE: 16 BRPM | OXYGEN SATURATION: 96 % | HEIGHT: 72 IN | HEART RATE: 72 BPM

## 2022-10-16 PROCEDURE — 99282 EMERGENCY DEPT VISIT SF MDM: CPT

## 2022-10-16 PROCEDURE — 99283 EMERGENCY DEPT VISIT LOW MDM: CPT

## 2022-10-16 RX ORDER — METFORMIN HYDROCHLORIDE 850 MG/1
4 TABLET ORAL
Qty: 0 | Refills: 0 | DISCHARGE

## 2022-10-16 RX ORDER — LOSARTAN/HYDROCHLOROTHIAZIDE 100MG-25MG
1 TABLET ORAL
Qty: 0 | Refills: 0 | DISCHARGE

## 2022-10-16 RX ORDER — ISOSORBIDE MONONITRATE 60 MG/1
1 TABLET, EXTENDED RELEASE ORAL
Qty: 0 | Refills: 0 | DISCHARGE

## 2022-10-16 RX ORDER — AMLODIPINE BESYLATE 2.5 MG/1
1 TABLET ORAL
Qty: 0 | Refills: 0 | DISCHARGE

## 2022-10-16 RX ORDER — ZOLPIDEM TARTRATE 10 MG/1
1 TABLET ORAL
Qty: 0 | Refills: 0 | DISCHARGE

## 2022-10-16 RX ORDER — CITALOPRAM 10 MG/1
1 TABLET, FILM COATED ORAL
Qty: 0 | Refills: 0 | DISCHARGE

## 2022-10-16 NOTE — ED PROVIDER NOTE - PATIENT PORTAL LINK FT
You can access the FollowMyHealth Patient Portal offered by [facility] by registering at the following website: [email or web address]. By joining [employer]’s FollowMyHealth portal, you will also be able to view your health information using other applications (apps) compatible with our system.

## 2022-10-16 NOTE — ED PROVIDER NOTE - CARE PROVIDER_API CALL
Zay Olivo ()  Internal Medicine  [address] [place] [address]  Phone: [phone]  Fax: [phone]  Follow Up Time:

## 2022-10-16 NOTE — ED PROVIDER NOTE - OBJECTIVE STATEMENT
89-year-old male history of hypertension diabetes depression insomnia and constipation complain of being constipated for several days complaining about rectal pressure and pain.  Wife gave stool softeners without much relief.  Was here 2 weeks ago for similar complaint.

## 2022-10-16 NOTE — ED PROVIDER NOTE - PHYSICAL EXAMINATION
Gen: Alert, NAD  Head/eyes: NC/AT, PERRL  ENT: airway patent  Neck: supple  Pulm/lung: Bilateral clear BS  CV/heart: RRR  GI/Abd: soft, NT/ND, +BS, no guarding/rebound tenderness, rectal exam: +hard stool in vault  Musculoskeletal: no edema/erythema/cyanosis  Skin: no rash  Neuro: AAOx3, grossly intact

## 2022-10-16 NOTE — ED PROVIDER NOTE - PROGRESS NOTE DETAILS
large amount of hard stool removed via manual disimpaction pt had a large BM on his own without enema, wants to be discharged

## 2022-10-16 NOTE — ED PROVIDER NOTE - NSFOLLOWUPINSTRUCTIONS_ED_ALL_ED_FT
1) Follow-up with your Primary Medical Doctor or referred doctor. Call today / next business day for prompt follow-up.  2) Return to Emergency room for any worsening or persistent pain, weakness, fever, or any other concerning symptoms.  3) See attached instruction sheets for additional information, including information regarding signs and symptoms to look out for, reasons to seek immediate care and other important instructions.  4) Follow-up with any specialists as discussed / noted as well.   5) Prune juice and stool softeners daily        Constipation is when a person has fewer than three bowel movements in a week, has difficulty having a bowel movement, or has stools (feces) that are dry, hard, or larger than normal. Constipation may be caused by an underlying condition. It may become worse with age if a person takes certain medicines and does not take in enough fluids.      Follow these instructions at home:      Eating and drinking      •Eat foods that have a lot of fiber, such as beans, whole grains, and fresh fruits and vegetables.      •Limit foods that are low in fiber and high in fat and processed sugars, such as fried or sweet foods. These include french fries, hamburgers, cookies, candies, and soda.      •Drink enough fluid to keep your urine pale yellow.      General instructions     •Exercise regularly or as told by your health care provider. Try to do 150 minutes of moderate exercise each week.      •Use the bathroom when you have the urge to go. Do not hold it in.      •Take over-the-counter and prescription medicines only as told by your health care provider. This includes any fiber supplements.    •During bowel movements:   •Practice deep breathing while relaxing the lower abdomen.      •Practice pelvic floor relaxation.        •Watch your condition for any changes. Let your health care provider know about them.      •Keep all follow-up visits as told by your health care provider. This is important.        Contact a health care provider if:    •You have pain that gets worse.      •You have a fever.      •You do not have a bowel movement after 4 days.      •You vomit.      •You are not hungry or you lose weight.      •You are bleeding from the opening between the buttocks (anus).      •You have thin, pencil-like stools.        Get help right away if:    •You have a fever and your symptoms suddenly get worse.      •You leak stool or have blood in your stool.      •Your abdomen is bloated.      •You have severe pain in your abdomen.      •You feel dizzy or you faint.        Summary    •Constipation is when a person has fewer than three bowel movements in a week, has difficulty having a bowel movement, or has stools (feces) that are dry, hard, or larger than normal.      •Eat foods that have a lot of fiber, such as beans, whole grains, and fresh fruits and vegetables.      •Drink enough fluid to keep your urine pale yellow.      •Take over-the-counter and prescription medicines only as told by your health care provider. This includes any fiber supplements.      This information is not intended to replace advice given to you by your health care provider. Make sure you discuss any questions you have with your health care provider.

## 2022-10-24 NOTE — ED PROVIDER NOTE - IV ALTEPLASE INCLUSION HIDDEN
GENERAL: It is common to have blood in your urine after your procedure. It may be pink or even red; inform your doctor if you have a significant amount of clot in the urine or if you are unable to void at all. The urine may clear and then become bloody again especially as you are more physically active.  BATHING: You may shower or bathe.  DIET: You may resume your regular diet and regular medication regimen.  PAIN: You may take Tylenol (acetaminophen) 650-975mg and/or Motrin (ibuprofen) 400-600mg, both available over the counter, for pain every 6 hours as needed. Do not exceed 4000mg of Tylenol (acetaminophen) daily. You may alternate these medications such that you take one or the other every 3 hours for around the clock pain coverage. Flomax (tamsulosin) 0.4mg at bedtime.  ANTIBIOTICS: Please finish the course of antibiotics you have already been sent.  STOOL SOFTENERS: Do not allow yourself to become constipated as straining may cause bleeding. Take stool softeners or a laxative (ex. Miralax, Colace, Senokot, ExLax, etc), available over the counter, if needed.  ACTIVITY: No heavy lifting or strenuous exercise until you are evaluated at your post-operative appointment. Otherwise, you may return to your usual level of physical activity.  FOLLOW-UP: Please follow up with Dr. Hoenig. His office will call to schedule an appointment.  CALL YOUR UROLOGIST IF: You have any bleeding that does not stop, inability to void >8 hours, fever over 100.4 F, chills, persistent nausea/vomiting, changes in your incision concerning for infection, or if your pain is not controlled on your discharge pain medications.
Detail Level: Simple
Recommendation Preamble: Assessment: via telephone
Assessment (Free Text): Due to the emergency of the COVID-19 pandemic, as declared by the World Health Organization on March 11, 2020, medical providers are rapidly shifting to accommodate the need to treat patients in conjunction with unprecedented guidance from federal, state and local authorities - which include, but are not limited to, selfquarantines and/or limiting physical proximity to others under any number of circumstances. It is within this context (and with the understanding that this method of patient encounter is in the patient’s best interest as well as the health and safety of other patients and the public) that “telehealth” is being provided for this patient encounter rather than a face-to-face visit. The patient has been advised of the potential risks and limitations of this mode of treatment (including, but not limited to, the absence of in-person examination) and has requested and agreed to be treated in a remote fashion in spite of them. The patient is aware that we will bill their insurance for this visit following Medicare guidelines, but they may be responsible for some or all of the visit charges if their insurance deems this \"non-covered.” Any and all of the patient’s/patient’s family’s questions on this issue have been answered. The patient has also been advised to contact this office for worsening conditions or problems, and seek emergency medical treatment and/or call 911 if the patient deems either necessary.
show

## 2022-11-11 NOTE — ED ADULT NURSE NOTE - PAIN RATING/NUMBER SCALE (0-10): REST
5

## 2022-12-23 NOTE — ED PROVIDER NOTE - CLINICAL SUMMARY MEDICAL DECISION MAKING FREE TEXT BOX
89 M on coumadin c/o epistaxis. Reports nasal injury January and since then has been having intermittent nose bleeds. Follows with Dr. Adriano Bowling ENT (last visit a few days ago). Denies additional trauma.    left anterior nares dried blood     bleeding started again shortly after exam    check INR, TXA (topical), monitor Student

## 2023-08-07 NOTE — ED ADULT NURSE REASSESSMENT NOTE - SKIN INTEGRITY
In an effort to ensure that our patients LiveWell, a Team Member has reviewed your chart and identified an opportunity to provide the best care possible. An attempt was made to discuss or schedule overdue Preventive or Disease Management screening. The Outcome was Contact was not made, left message If you have any questions or need help with scheduling, contact our Health Outreach Team at [phone]. Care Gaps include cav visit. Name: Maddie Alegria    ### Patient Details  YOB: 1947  MRN: [id number]    ### Encounter Details  Arrival Date: N/A  Discharge Date: N/A  Encounter ID: [id number] 8:20    ### Related interaction  [address] Comprehensive Annual Visit ([address] [facility] [address] CAV Outreach) ([email or web address])    ### Questions     Question 1   Comprehensive Annual Visit   If you would like us to help you schedule your in home visit or if you prefer a virtual visit, press 1; If you would like to receive a call back later, press 2; If you would like more information about this program, please press 3; or if you are not interested at this time, please press 4.    Callback Later (Issue Panel: CAV Visit Issue, Issue Alert: CAV Intervention)    ### Required Interventions and Feedback     Call Status         Call Status List:     Answered (edited by GAGANDEEP on 08/07/2023 03:44 PM CDT), Voicemail Left/1st Attempt (edited by [facility] on 07/18/2023 12:35 PM CDT)     Appointment Scheduling         Unable to Schedule CAV Appointment:     Patient declined (edited by GAGANDEEP on 08/07/2023 03:48 PM CDT)
intact

## 2023-10-03 NOTE — ED ADULT TRIAGE NOTE - SPO2 (%)
No PMH, NKDA. Pt was ice skating today. Fell on R elbow. No head injury. No obvious deformities. No meds given. Pt awake, alert, interacting appropriately. Pt coloring appropriate, brisk capillary refill noted, easy WOB noted.
99

## 2023-11-22 NOTE — ED PROVIDER NOTE - DOMESTIC TRAVEL HIGH RISK QUESTION
Name: Bandar Andino      : 1965      MRN: [id number]  Encounter Provider: Ninfa Coles MD  Encounter Date: 2023   Encounter department: [address] [place] [address]     1. Encounter for wellness examination in adult    2. Dyslipidemia  -     CBC and differential; Future  -     Comprehensive metabolic panel; Future  -     Lipid Panel with Direct LDL reflex; Future  -     TSH, 3rd generation; Future    3. Depression with anxiety  -     busPIRone (BUSPAR) 5 mg tablet; 1 tablet twice a day as needed for anxiety, if needed dose could be increased up to 2 tablets twice a day  -     traZODone (DESYREL) 50 mg tablet; Take 1 tablet at bedtime, if needed dose could be increased up to 2 tablets at bedtime  -     PARoxetine (PAXIL-CR) 25 mg 24 hr tablet; Take 1 tablet (25 mg total) by mouth every morning    4. Encounter for screening mammogram for breast cancer  -     Mammo screening bilateral w 3d & cad; Future; Expected date: 2023    5. Vitamin D deficiency  -     Vitamin D 25 hydroxy; Future    6. Other fatigue  -     CBC and differential; Future  -     Comprehensive metabolic panel; Future  -     TSH, 3rd generation; Future    We discussed and I recommended influenza and Shingrix vaccinations. Patient Instructions   Please schedule MRI of liver towards end December  Please proceed with colonoscopy  You are due for screening mammogram  Blood work  Glass a warm water in the morning on an empty stomach  IBgard, peppermint oil capsules are helpful for IBS. Ginger. Papaya extract  FODMAP diet for flares         Subjective     Annual well exam  Feels well  Anxiety - chronic, on Paxil CR 25 mg daily  Falls asleep easily - cannot  stay asleep  Patient has tried alternative SSRIs for anxiety but they have been ineffective. No complaints of chest pain, palpitations, shortness of breath or dizziness.     Patient was evaluated by gastroenterology and is due for liver MRI by No end of December 2023  She is due for colonoscopy and mammogram.  Patient reports chronic symptoms of IBS, bloating, gas. Review of Systems   Constitutional: Negative. HENT: Negative. Eyes: Negative. Respiratory: Negative. Cardiovascular: Negative. Gastrointestinal: Negative. Endocrine: Negative. Genitourinary: Negative. Musculoskeletal: Negative. Allergic/Immunologic: Negative. Neurological: Negative. Hematological: Negative. Psychiatric/Behavioral:  Positive for sleep disturbance. The patient is nervous/anxious.          As per HPI       Past Medical History:   Diagnosis Date   • Headache    • Pyelonephritis 3/25/2022     Past Surgical History:   Procedure Laterality Date   • BREAST CYST EXCISION Right     benign   • TRIGGER FINGER RELEASE Right 02/13/2023     Family History   Problem Relation Age of Onset   • Seizures Sister    • Lupus Sister      Social History     Socioeconomic History   • Marital status:      Spouse name: None   • Number of children: None   • Years of education: None   • Highest education level: None   Occupational History     Employer: [employer]   Tobacco Use   • Smoking status: Every Day     Packs/day: 0.25     Years: 25.00     Total pack years: 6.25     Types: Cigarettes   • Smokeless tobacco: Never   Vaping Use   • Vaping Use: Never used   Substance and Sexual Activity   • Alcohol use: Yes     Comment: occasionally   • Drug use: No   • Sexual activity: None   Other Topics Concern   • None   Social History Narrative   • None     Social Determinants of Health     Financial Resource Strain: Not on file   Food Insecurity: Not on file   Transportation Needs: Not on file   Physical Activity: Not on file   Stress: Not on file   Social Connections: Not on file   Intimate Partner Violence: Not on file   Housing Stability: Not on file     Current Outpatient Medications on File Prior to Visit   Medication Sig   • b complex vitamins capsule Take 1 capsule by mouth daily   • cholecalciferol (VITAMIN D3) 1,000 units tablet Take 1,000 Units by mouth daily   • Ciclopirox 0.77 % gel    • Cranberry 250 MG CAPS Take by mouth     Allergies   Allergen Reactions   • Azithromycin Nausea Only   • Iodine - Food Allergy Swelling and Edema     Reaction Date: 14Sep2011;    • Zoloft [Sertraline]      Worsening of anxiety     Immunization History   Administered Date(s) Administered   • INFLUENZA 11/01/2018   • Influenza, recombinant, quadrivalent,injectable, preservative free 12/10/2019   • Influenza, seasonal, injectable, preservative free 11/01/2018       Objective     /80 (BP Location: Left arm, Patient Position: Sitting, Cuff Size: Standard)   Pulse 80   Temp 98.2 °F (36.8 °C) (Temporal)   Resp 18   Ht 5' 1" (1.549 m)   Wt 55.5 kg (122 lb 6.4 oz)   SpO2 98%   BMI 23.13 kg/m²     Physical Exam  Vitals and nursing note reviewed. Constitutional:       General: She is not in acute distress. Appearance: Normal appearance. She is well-developed. She is not ill-appearing. HENT:      Head: Normocephalic and atraumatic. Eyes:      Conjunctiva/sclera: Conjunctivae normal.   Neck:      Thyroid: No thyromegaly. Vascular: No carotid bruit. Cardiovascular:      Rate and Rhythm: Normal rate and regular rhythm. Heart sounds: Normal heart sounds. No murmur heard. Pulmonary:      Effort: Pulmonary effort is normal. No respiratory distress. Breath sounds: Normal breath sounds. No wheezing. Abdominal:      General: Bowel sounds are normal. There is no distension or abdominal bruit. Palpations: Abdomen is soft. Tenderness: There is no abdominal tenderness. Musculoskeletal:         General: Normal range of motion. Cervical back: Neck supple. Neurological:      General: No focal deficit present. Mental Status: She is alert and oriented to person, place, and time. Cranial Nerves: No cranial nerve deficit.       Coordination: Coordination normal.   Psychiatric:         Mood and Affect: Mood normal.         Behavior: Behavior normal.       Giovanni Ortiz MD

## 2024-01-04 NOTE — ED ADULT NURSE NOTE - SUICIDE SCREENING QUESTION 2
Anu Ceja was seen and treated in our emergency department on 1/4/2024.    She needs to take ofloxacin eyedrops 4 times daily for the next 7 days which will require a dose to be administered during school hours.  She may return to activity 24 hours after initiating medications.       Sincerely,     [facility] Emergency Dept No

## 2024-05-31 NOTE — PATIENT PROFILE ADULT - PUBLIC BENEFITS
Render Post-Care Instructions In Note?: no Show Spray Paint Technique Variable?: Yes Spray Paint Text: The liquid nitrogen was applied to the skin utilizing a spray paint frosting technique. Billing Information: Bill by Static Price Post-Care Instructions: I reviewed with the patient in detail post-care instructions. Patient is to wear sunprotection, and avoid picking at any of the treated lesions. Pt may apply Vaseline to crusted or scabbing areas. Consent: The patient's consent was obtained including but not limited to risks of crusting, scabbing, blistering, scarring, darker or lighter pigmentary change, recurrence, incomplete removal and infection. The patient understands that the procedure is cosmetic in nature and is not covered by insurance. Price (Use Numbers Only, No Special Characters Or $): 0 Detail Level: Detailed no

## 2024-07-16 NOTE — ED PROVIDER NOTE - CARDIOVASCULAR [-], MLM
Chief Complaint  Low blood counts    Malka, Paris, TIEN Ace, [place], APRDARIELA      Subjective          Gifty Hoover presents to [facility] for anemia    History of Present Illness   Ms. Gifty Hoover is a 66 yo F presenting for follow up for anemia. She has a history of CAD, HTN, hepatic steatosis, GERD, obesity, HLD. She is taking iron every other day with vitamin C. She is tolerating it well.  She reports her energy level is stable.  She does report some days of more fatigued than others.  She requires more rest on these days.  She denies any bleeding.  No blood in stool or melena reported.  No shortness of breath or cough.  No belly pain.  No fevers or chills.    Hematology History     7/7/22 CBC with Hgb 9.9, WBC 4.28, plt 213, MCV 92.3. Positive occult blood. Also with transaminitis on labs from 6/30/22. Hemoglobin is in the 9.9 range. She has CKD as well with cr of 1.60 and GFR of 35. Iron studies with normal iron level, but iron sat was 15%, ferritin was elevated at 241. No prior labs to compare to on the ferritin. Previous colonoscopy with diverticulosis and grade I hemmorrhoids    8/3 CBC with improved hgb to 10.1, MCV 92.3, plt 241, WBC of 3.27. Abdominal ultrasound showed hepatic steatosis. SPEP without M-spike.     9/9/22 Iron studies: Ferritin down to 88.9, T sat of 12%, TIBC 429.     11/23-26/22: [facility] admission due to choledocholithiasis and biliary colic requiring ERCP, balloon extraction of 2 stones and placement of stent. Hgb sarabjit of 7.6 during this stay. Iron sat of 4%, iron low at 16    11/29/22: Hgb 9.9.    4/5/23: Hgb 10.5. Iron 38, ferritin 33, iron sat 8%, TIBC 460.    6/5/23: Hgb 11.2, Iron sat 11%, ferritin 40.     10/4/23: Hgb 12.0 g/dL, iron 55, ferritin 38, iron sat 11%, TIBC 498    2/7/24: Hgb 12.4 g/dL, MCV 96.4, Ferritin 54.9, iron sat 11%. Continue oral iron every other day with vitamin C.     7/15/24: Hgb 11.6, ferritin 61, iron sat  11%      Oncology/Hematology History    No history exists.       Review of Systems   Constitutional:  Negative for appetite change, diaphoresis, fatigue, fever, unexpected weight gain and unexpected weight loss.   HENT: Negative.  Negative for hearing loss, sore throat and voice change.    Eyes: Negative.  Negative for blurred vision, double vision, pain, redness and visual disturbance.   Respiratory:  Negative for cough, shortness of breath and wheezing.    Cardiovascular: Negative.  Negative for chest pain, palpitations and leg swelling.   Gastrointestinal: Negative.    Endocrine: Negative.  Negative for cold intolerance, heat intolerance, polydipsia and polyuria.   Genitourinary:  Negative for decreased urine volume, difficulty urinating, frequency and urinary incontinence.   Musculoskeletal: Negative.  Negative for arthralgias, back pain, joint swelling and myalgias.   Skin:  Negative for color change, rash, skin lesions and wound.   Allergic/Immunologic: Negative.    Neurological: Negative.  Negative for dizziness, seizures, numbness and headache.   Hematological: Negative.  Negative for adenopathy. Does not bruise/bleed easily.   Psychiatric/Behavioral: Negative.  Negative for depressed mood. The patient is not nervous/anxious.    All other systems reviewed and are negative.      Current Outpatient Medications on File Prior to Visit   Medication Sig Dispense Refill    albuterol sulfate HFA (ProAir HFA) 108 (90 Base) MCG/ACT inhaler Inhale 2 puffs Every 6 (Six) Hours As Needed for Wheezing. 18 g 0    Ascorbic Acid (VITAMIN C PO) Take  by mouth.      aspirin 81 MG EC tablet Take 1 tablet by mouth Daily.      Calcium Carb-Cholecalciferol (CALCIUM/VITAMIN D PO) Take  by mouth.      ezetimibe (ZETIA) 10 MG tablet Take 1 tablet by mouth Daily. 90 tablet 3    famotidine (PEPCID) 40 MG tablet TAKE ONE TABLET BY MOUTH EVERY NIGHT AT BEDTIME 90 tablet 1    fluconazole (Diflucan) 150 MG tablet Take 1 tablet by mouth  Every 72 (Seventy-Two) Hours As Needed (vaginal itching). 3 tablet 0    furosemide (LASIX) 20 MG tablet Take 1 tablet by mouth Daily As Needed (for leg swelling). 30 tablet 3    hydroCHLOROthiazide 12.5 MG tablet Take 1 tablet by mouth Every Morning. 90 tablet 1    IRON, FERROUS SULFATE, PO Take  by mouth.      levothyroxine (SYNTHROID, LEVOTHROID) 88 MCG tablet Take 1 tablet by mouth Daily. 90 tablet 1    metFORMIN (GLUCOPHAGE) 500 MG tablet TAKE ONE TABLET BY MOUTH TWICE DAILY WITH MEALS 180 tablet 1    omeprazole (priLOSEC) 40 MG capsule Take 1 capsule by mouth Daily. 90 capsule 1    potassium chloride 10 MEQ CR tablet Take 1 tablet by mouth Daily As Needed (take with Lasix). 30 tablet 3    Pradaxa 150 MG capsu Take 1 capsule by mouth 2 (Two) Times a Day. 180 capsule 1    Premarin 0.625 MG/GM vaginal cream Apply daily for 2 weeks then use 2 times weekly thereafter. 30 g 1    rosuvastatin (CRESTOR) 40 MG tablet Take 1 tablet by mouth Every Night. 90 tablet 1    sulfamethoxazole-trimethoprim (Bactrim DS) 800-160 MG per tablet Take 1 tablet by mouth 2 (Two) Times a Day. 14 tablet 0    telmisartan (MICARDIS) 80 MG tablet Take 1 tablet by mouth Every Night. 90 tablet 1     No current facility-administered medications on file prior to visit.       No Known Allergies  Past Medical History:   Diagnosis Date    Anemia 2022    NO CURRENT S/S    Anxiety     Arthritis     Asthma 2020    Shortness of breath after walking long periods of time.    CAD 09/14/2021    [facility] SEES PT    Cholelithiasis 2022    Chronic kidney disease 2022    NO DIALYSIS    Colon polyp 2008    BENIGN    Deep vein thrombosis 2011    Green filter in place.    Depression     Diabetes mellitus     Elevated cholesterol     Essential hypertension 09/14/2021    GERD (gastroesophageal reflux disease)     Heart attack 2009    NO STENTS/NO BYPASS    History of pulmonary embolism     2011, TAKE PRADAXA    Hypothyroid     Ingrown toenail 2022    Lumbar  radiculopathy 2018    describes sensory S1 radiculopathy but would favor right L5    Paroxysmal atrial fibrillation 2021    AGUAYO    Seasonal allergies     Sleep apnea     BIPAP?     Past Surgical History:   Procedure Laterality Date    BLADDER SURGERY  2011    BREAST BIOPSY  2002    Upper right breast BENIGN    BREAST SURGERY      CARDIAC CATHETERIZATION      NO INTERVENTION    CARDIAC SURGERY      R/T BLOOD CLOTS, NO CARDIAC INTERVENTION     SECTION  1982    CHOLECYSTECTOMY N/A 2022    Procedure: laparoscopic cholecystectomy, possible open;  Surgeon: Krystal Dacosta MD;  [place] [facility] [place] [facility] [place]  Service: General;  Laterality: N/A;    COLONOSCOPY      COLONOSCOPY N/A 2022    Procedure: COLONOSCOPY;  Surgeon: Ta Vasquez MD;  Location: [facility] ENDOSCOPY;  Service: Gastroenterology;  Laterality: N/A;  DIVERTICULOSIS    ENDOSCOPY N/A 2022    Procedure: ESOPHAGOGASTRODUODENOSCOPY WITH BX;  Surgeon: Ta Vasquez MD;  Location: [facility] ENDOSCOPY;  Service: Gastroenterology;  Laterality: N/A;  SMALL HIATAL HERNIA    ERCP N/A 2022    Procedure: ENDOSCOPIC RETROGRADE CHOLANGIOPANCREATOGRAPHY WITH SPHINCTEROTOMY, STONE REMOVAL, STENT PLACEMENT;  Surgeon: Arden Garcia MD;  Location: [facility] ENDOSCOPY;  Service: Gastroenterology;  Laterality: N/A;  BILE DUCT STONES    ERCP N/A 2023    Procedure: ENDOSCOPIC RETROGRADE CHOLANGIOPANCREATOGRAPHY with stent removal, balloon dilation, stone removal;  Surgeon: Arden Garcia MD;  Location: [facility] ENDOSCOPY;  Service: Gastroenterology;  Laterality: N/A;  bile duct stones    NICHOLAS FILTER INSERTION   2011    HYSTERECTOMY       Social History     Socioeconomic History    Marital status:    Tobacco Use    Smoking status: Never    Smokeless tobacco: Never   Vaping Use    Vaping status: Never Used   Substance and Sexual Activity    Alcohol use: Never    Drug use: Never     "Sexual activity: Not Currently     Partners: Male     Birth control/protection: Surgical, Hysterectomy     Family History   Problem Relation Age of Onset    Stroke Mother     Cancer Mother         Colon    Colon cancer Mother 71        still living at 73    Diabetes Mother     Arthritis Mother     Colon polyps Mother     Hypertension Mother     Kidney disease Mother     Miscarriages / Stillbirths Mother     Stroke Father     Arthritis Father         Padgets Disease    Osteoporosis Father     Inflammatory bowel disease Father     Cancer Sister         Eye    Thyroid disease Sister     Diabetes Brother     Stroke Brother     Bleeding Disorder Daughter     Diabetes Brother     Hypertension Brother     Malig Hyperthermia Neg Hx      Immunization History   Administered Date(s) Administered    COVID-19 (PFIZER) Purple Cap Monovalent 04/03/2021, 04/26/2021    Flu Vaccine Split Quad 11/06/2019    FluMist 2-49yrs 12/07/2022    Fluad Quad 65+ 10/01/2021    Fluzone (or Fluarix & Flulaval for VFC) >6mos 11/06/2019    Fluzone High-Dose 65+yrs 12/07/2022, 03/04/2024    Influenza Quad Vaccine (Inpatient) 11/06/2019    Influenza, Unspecified 10/01/2021    Pneumococcal Conjugate 13-Valent (PCV13) 06/30/2022    Pneumococcal Conjugate 20-Valent (PCV20) 06/30/2022    Pneumococcal Polysaccharide (PPSV23) 10/13/2017       Objective   Physical Exam  Well appearing patient in no acute distress on RA  Anicteric sclerae, no rash on exposed skin  Respirations non-labored  Awake, alert, and oriented x 4. Speech intact. No gross neurologic deficit  Appropriate mood and affect    Vitals:    07/16/24 1051   BP: 125/65   Pulse: 98   Resp: 18   Temp: 97.7 °F (36.5 °C)   TempSrc: Temporal   SpO2: 98%   Weight: 114 kg (252 lb 3.2 oz)   Height: 152.4 cm (60\")   PainSc: 0-No pain       ECOG score: 0         ECOG: (1) Restricted in Physically Strenuous Activity, Ambulatory & Able to Do Work of Light Nature  Fall Risk Assessment was completed, and " patient is at low risk for falls.  PHQ-9 Total Score:         The patient is  experiencing fatigue. Fatigue score: 7    PT/OT Functional Screening: PT fx screen: No needs identified  Speech Functional Screening: Speech fx screen: No needs identified  Rehab to be ordered: Rehab to be ordered: No needs identified        Result Review :   The following data was reviewed by:Toño Carroll MD on 07/16/24:  Lab Results   Component Value Date    HGB 11.6 (L) 07/15/2024    HCT 37.5 07/15/2024    MCV 96.6 07/15/2024     07/15/2024    WBC 5.04 07/15/2024    NEUTROABS 3.67 07/15/2024    LYMPHSABS 0.81 07/15/2024    MONOSABS 0.39 07/15/2024    EOSABS 0.12 07/15/2024    BASOSABS 0.03 07/15/2024     Lab Results   Component Value Date    GLUCOSE 137 (H) 04/19/2024    BUN 15 04/19/2024    CREATININE 1.04 (H) 04/19/2024     04/19/2024    K 4.2 04/19/2024     04/19/2024    CO2 28.0 04/19/2024    CALCIUM 9.5 04/19/2024    PROTEINTOT 6.9 04/19/2024    ALBUMIN 4.2 04/19/2024    BILITOT 0.6 04/19/2024    ALKPHOS 69 04/19/2024    AST 26 04/19/2024    ALT 24 04/19/2024     Iron labs reviewed and revealed slight anemia, stable iron sat, improved ferritin    PCP note personally reviewed         Assessment and Plan    Diagnoses and all orders for this visit:    1. Other iron deficiency anemia (Primary)  -     CBC & Differential; Future  -     Ferritin; Future  -     Iron Profile; Future    2. Primary hypertension    Other orders  -     sodium chloride 0.9 % infusion  -     ferumoxytol (FERAHEME) 510 mg in sodium chloride 0.9 % 117 mL IVPB  -     sodium chloride 0.9 % infusion  -     ferumoxytol (FERAHEME) 510 mg in sodium chloride 0.9 % 117 mL IVPB        Patient with blood loss from hospital stay due to choledocholithiasis in November of 2022. Patient with EGD and C-scope 11/2022 with no source of bleeding, however she does have diverticulosis.  Hgb is normal as of 10/4/23 and 2/7/24. It has dropped to 11.6 as of  7/15/24. Ferritin only slightly better and iron sat stable, but iron sat low at 11%. Ferritin not at goal of around 100. She is on iron supplementation orally every other day and is tolerating it well. Vitamin C previously added to help with absorption. As labs only minimally better but anemia worse and patient symptomatic, will proceed with IV iron.  Patient agreeable repeat labs 3 months.      HTN  Controlled in office. Follow up outpatient.     I spent 20 minutes caring for Gifty on this date of service. This time includes time spent by me in the following activities:preparing for the visit, reviewing tests, obtaining and/or reviewing a separately obtained history, performing a medically appropriate examination and/or evaluation , counseling and educating the patient/family/caregiver, ordering medications, tests, or procedures, referring and communicating with other health care professionals , documenting information in the medical record and independently interpreting results and communicating that information with the patient/family/caregiver    Patient Follow Up: 4 months with labs  Patient was given instructions and counseling regarding her condition or for health maintenance advice. Please see specific information pulled into the AVS if appropriate.        no chest pain

## 2025-04-11 NOTE — PROGRESS NOTE ADULT - PROBLEM SELECTOR PROBLEM 3
Hyponatremia
Hyponatremia
[facility]  Urology  [address] [place] [address], [address]  [place] [address]  Phone: [phone]  Fax: [phone]  Scheduled Appointment: 04/21/2025 02:20 AM  
Hyponatremia

## 2025-07-22 NOTE — ED ADULT NURSE NOTE - CHPI ED NUR DURATION
Detail Level: Detailed
Size Of Lesion In Cm: 0.6
Anesthesia Type: 1% lidocaine with epinephrine
Anesthesia Volume In Cc: 1
Cautery Type: electrodesiccation
Number Of Curettages: 2
What Was Performed First?: Curettage
Additional Information: (Optional): The wound was cleaned, and a pressure dressing was applied.  The patient received detailed post-op instructions.
Bill For Surgical Tray: no
Consent was obtained from the patient. The risks, benefits and alternatives to therapy were discussed in detail. Specifically, the risks of infection, scarring, bleeding, prolonged wound healing, nerve injury, incomplete removal, allergy to anesthesia and recurrence were addressed. Alternatives to ED&C, such as: surgical removal and XRT were also discussed.  Prior to the procedure, the treatment site was clearly identified and confirmed by the patient. All components of Universal Protocol/PAUSE Rule completed.
Post-Care Instructions: I reviewed with the patient in detail post-care instructions. Patient is to keep the area dry for 48 hours, and not to engage in any swimming until the area is healed. Should the patient develop any fevers, chills, bleeding, severe pain patient will contact the office immediately.
Size Of Lesion In Cm: 0.8
today